# Patient Record
Sex: FEMALE | Race: BLACK OR AFRICAN AMERICAN | NOT HISPANIC OR LATINO | Employment: PART TIME | URBAN - METROPOLITAN AREA
[De-identification: names, ages, dates, MRNs, and addresses within clinical notes are randomized per-mention and may not be internally consistent; named-entity substitution may affect disease eponyms.]

---

## 2017-07-19 ENCOUNTER — HOSPITAL ENCOUNTER (OUTPATIENT)
Dept: ORTHOPEDIC SURGERY | Facility: CLINIC | Age: 47
Discharge: HOME | End: 2017-07-19
Attending: PODIATRIST | Admitting: PODIATRIST

## 2017-08-01 ENCOUNTER — HOSPITAL ENCOUNTER (OUTPATIENT)
Dept: ORTHOPEDIC SURGERY | Facility: CLINIC | Age: 47
Discharge: HOME | End: 2017-08-01
Attending: ORTHOPAEDIC SURGERY | Admitting: ORTHOPAEDIC SURGERY

## 2017-11-08 ENCOUNTER — HOSPITAL ENCOUNTER (OUTPATIENT)
Dept: CT IMAGING | Facility: HOSPITAL | Age: 47
Discharge: HOME | End: 2017-11-08
Attending: NURSE PRACTITIONER | Admitting: NURSE PRACTITIONER

## 2017-11-08 LAB — CREAT BLDA-MCNC: 0.6 MG/DL (ref 0.6–1.3)

## 2018-01-05 ENCOUNTER — HOSPITAL ENCOUNTER (OUTPATIENT)
Dept: PREADMISSION TESTING | Facility: HOSPITAL | Age: 48
Discharge: HOME | End: 2018-01-05
Attending: PODIATRIST | Admitting: PODIATRIST

## 2018-01-05 LAB
ANION GAP SERPL CALC-SCNC: 10.9 MMOL/L (ref 10–20)
BASOPHILS # BLD AUTO: 0 10*3/UL (ref 0–0.2)
BASOPHILS NFR BLD AUTO: 0 % (ref 0–2)
BUN SERPL-MCNC: 8 MG/DL (ref 8–20)
BUN/CREAT SERPL: 11.4 (ref 5.4–26.2)
CALCIUM SERPL-MCNC: 9.6 MG/DL (ref 8.9–10.3)
CHLORIDE SERPL-SCNC: 104 MMOL/L (ref 101–111)
CONV CO2: 28 MMOL/L (ref 22–32)
CREAT UR-MCNC: 0.7 MG/DL (ref 0.4–1)
DIFFERENTIAL METHOD BLD: (no result)
EOSINOPHIL # BLD AUTO: 0.1 10*3/UL (ref 0–0.3)
EOSINOPHIL # BLD AUTO: 1 % (ref 0–3)
ERYTHROCYTE [DISTWIDTH] IN BLOOD BY AUTOMATED COUNT: 14.2 % (ref 11.5–14.5)
GLUCOSE SERPL-MCNC: 101 MG/DL (ref 65–99)
HCT VFR BLD AUTO: 40.9 % (ref 35–49)
HGB BLD-MCNC: 13.6 G/DL (ref 12–15)
LYMPHOCYTES # BLD AUTO: 3.5 10*3/UL (ref 0.8–4.8)
LYMPHOCYTES NFR BLD AUTO: 35 % (ref 18–42)
MCH RBC QN AUTO: 31.4 PG (ref 26–32)
MCHC RBC AUTO-ENTMCNC: 33.1 G/DL (ref 32–36)
MCV RBC AUTO: 94.8 FL (ref 80–94)
MONOCYTES # BLD AUTO: 0.6 10*3/UL (ref 0.1–1.3)
MONOCYTES NFR BLD AUTO: 6 % (ref 2–11)
NEUTROPHILS # BLD AUTO: 5.8 10*3/UL (ref 2.3–8.6)
NEUTROPHILS NFR BLD AUTO: 58 % (ref 50–75)
NRBC BLD AUTO-RTO: 0 /100{WBCS}
NRBC/RBC NFR BLD MANUAL: 0 10*3/UL
PLATELET # BLD AUTO: 245 10*3/UL (ref 150–450)
PMV BLD AUTO: 8.8 FL (ref 7.4–10.4)
POTASSIUM SERPL-SCNC: 3.9 MMOL/L (ref 3.6–5.1)
RBC # BLD AUTO: 4.32 10*6/UL (ref 4–5.4)
SODIUM SERPL-SCNC: 139 MMOL/L (ref 136–144)
WBC # BLD AUTO: 10.1 10*3/UL (ref 4.5–11.5)

## 2018-02-22 ENCOUNTER — HOSPITAL ENCOUNTER (OUTPATIENT)
Dept: GENERAL RADIOLOGY | Facility: HOSPITAL | Age: 48
Discharge: HOME | End: 2018-02-22
Attending: UROLOGY | Admitting: UROLOGY

## 2018-03-01 ENCOUNTER — HOSPITAL ENCOUNTER (OUTPATIENT)
Dept: ORTHOPEDIC SURGERY | Facility: CLINIC | Age: 48
Discharge: HOME | End: 2018-03-01
Attending: PODIATRIST | Admitting: PODIATRIST

## 2018-03-29 ENCOUNTER — HOSPITAL ENCOUNTER (OUTPATIENT)
Dept: ORTHOPEDIC SURGERY | Facility: CLINIC | Age: 48
Discharge: HOME | End: 2018-03-29
Attending: PODIATRIST | Admitting: PODIATRIST

## 2018-05-10 ENCOUNTER — HOSPITAL ENCOUNTER (OUTPATIENT)
Dept: ORTHOPEDIC SURGERY | Facility: CLINIC | Age: 48
Discharge: HOME | End: 2018-05-10
Attending: PODIATRIST | Admitting: PODIATRIST

## 2018-08-22 ENCOUNTER — HOSPITAL ENCOUNTER (OUTPATIENT)
Dept: PHYSICAL THERAPY | Facility: HOSPITAL | Age: 48
Setting detail: RECURRING SERIES
Discharge: HOME | End: 2018-11-04
Attending: PODIATRIST | Admitting: PODIATRIST

## 2019-01-31 ENCOUNTER — HOSPITAL ENCOUNTER (OUTPATIENT)
Dept: ORTHOPEDIC SURGERY | Facility: CLINIC | Age: 49
Discharge: HOME | End: 2019-01-31
Attending: PODIATRIST | Admitting: PODIATRIST

## 2019-07-29 RX ORDER — EMPAGLIFLOZIN 10 MG/1
TABLET, FILM COATED ORAL
Qty: 30 TABLET | Refills: 0 | Status: SHIPPED | OUTPATIENT
Start: 2019-07-29 | End: 2021-08-05

## 2019-08-28 RX ORDER — EMPAGLIFLOZIN 10 MG/1
TABLET, FILM COATED ORAL
Qty: 30 TABLET | Refills: 0 | OUTPATIENT
Start: 2019-08-28

## 2019-09-23 RX ORDER — BLOOD-GLUCOSE METER
KIT MISCELLANEOUS
OUTPATIENT
Start: 2019-09-23

## 2020-11-10 ENCOUNTER — ON CAMPUS - OUTPATIENT (OUTPATIENT)
Dept: URBAN - METROPOLITAN AREA HOSPITAL 77 | Facility: HOSPITAL | Age: 50
End: 2020-11-10
Payer: COMMERCIAL

## 2020-11-10 DIAGNOSIS — R13.10 DYSPHAGIA, UNSPECIFIED: ICD-10-CM

## 2020-11-10 DIAGNOSIS — R07.89 OTHER CHEST PAIN: ICD-10-CM

## 2020-11-10 DIAGNOSIS — K21.9 GASTRO-ESOPHAGEAL REFLUX DISEASE WITHOUT ESOPHAGITIS: ICD-10-CM

## 2020-11-10 DIAGNOSIS — I10 ESSENTIAL (PRIMARY) HYPERTENSION: ICD-10-CM

## 2020-11-10 PROCEDURE — 99202 OFFICE O/P NEW SF 15 MIN: CPT | Performed by: NURSE PRACTITIONER

## 2020-11-11 ENCOUNTER — ON CAMPUS - OUTPATIENT (OUTPATIENT)
Dept: URBAN - METROPOLITAN AREA HOSPITAL 77 | Facility: HOSPITAL | Age: 50
End: 2020-11-11
Payer: COMMERCIAL

## 2020-11-11 DIAGNOSIS — K29.80 DUODENITIS WITHOUT BLEEDING: ICD-10-CM

## 2020-11-11 DIAGNOSIS — R13.10 DYSPHAGIA, UNSPECIFIED: ICD-10-CM

## 2020-11-11 DIAGNOSIS — K22.70 BARRETT'S ESOPHAGUS WITHOUT DYSPLASIA: ICD-10-CM

## 2020-11-11 DIAGNOSIS — K29.50 UNSPECIFIED CHRONIC GASTRITIS WITHOUT BLEEDING: ICD-10-CM

## 2020-11-11 PROCEDURE — 43239 EGD BIOPSY SINGLE/MULTIPLE: CPT | Performed by: INTERNAL MEDICINE

## 2020-11-11 PROCEDURE — 43450 DILATE ESOPHAGUS 1/MULT PASS: CPT | Performed by: INTERNAL MEDICINE

## 2021-03-29 ENCOUNTER — TELEPHONE (OUTPATIENT)
Dept: ENDOCRINOLOGY | Facility: CLINIC | Age: 51
End: 2021-03-29

## 2021-03-29 NOTE — TELEPHONE ENCOUNTER
Pt called stating she was previously a pt of Dr. PHAM but will be seeing him again in August. In the meantime, her PCP told her to call about her blood sugars to [facility]. Pt only had one reading she could give me over the phone. Advised pt to get a couple days worth of BGs to see what adjustments need to be made.

## 2021-07-27 RX ORDER — LISINOPRIL AND HYDROCHLOROTHIAZIDE 25; 20 MG/1; MG/1
TABLET ORAL EVERY 24 HOURS
COMMUNITY
Start: 2017-12-11

## 2021-07-27 RX ORDER — GABAPENTIN 600 MG/1
TABLET ORAL EVERY 6 HOURS
COMMUNITY
Start: 2018-03-01

## 2021-07-27 RX ORDER — GLIMEPIRIDE 4 MG/1
TABLET ORAL EVERY 12 HOURS
COMMUNITY
Start: 2018-12-04 | End: 2021-08-05

## 2021-07-27 RX ORDER — HYDROCODONE BITARTRATE AND ACETAMINOPHEN 10; 325 MG/1; MG/1
TABLET ORAL EVERY 8 HOURS
COMMUNITY
Start: 2017-11-09

## 2021-07-27 RX ORDER — ATORVASTATIN CALCIUM 10 MG/1
TABLET, FILM COATED ORAL
COMMUNITY
Start: 2017-11-09 | End: 2021-08-13

## 2021-07-27 RX ORDER — BLOOD-GLUCOSE METER
KIT MISCELLANEOUS
COMMUNITY
Start: 2018-12-04

## 2021-08-04 PROBLEM — M87.073 AVASCULAR NECROSIS OF TALUS: Status: ACTIVE | Noted: 2017-07-19

## 2021-08-04 PROBLEM — M79.671 FOOT PAIN, RIGHT: Status: ACTIVE | Noted: 2017-07-19

## 2021-08-04 PROBLEM — M19.079 ARTHRITIS OF FOOT: Status: ACTIVE | Noted: 2017-07-19

## 2021-08-04 PROBLEM — M62.81 MUSCLE WEAKNESS OF LOWER EXTREMITY: Status: ACTIVE | Noted: 2018-08-08

## 2021-08-04 PROBLEM — E11.9 TYPE 2 DIABETES MELLITUS WITHOUT COMPLICATIONS: Status: ACTIVE | Noted: 2017-11-09

## 2021-08-04 PROBLEM — Z83.3 FAMILY HISTORY OF DIABETES MELLITUS: Status: ACTIVE | Noted: 2021-08-04

## 2021-08-04 PROBLEM — I10 HYPERTENSION, BENIGN: Status: ACTIVE | Noted: 2018-12-04

## 2021-08-04 PROBLEM — E11.65 TYPE 2 DIABETES MELLITUS WITH HYPERGLYCEMIA: Status: ACTIVE | Noted: 2018-12-04

## 2021-08-04 PROBLEM — M76.70 PERONEAL TENDINITIS: Status: ACTIVE | Noted: 2019-01-31

## 2021-08-04 PROBLEM — E78.5 HYPERLIPIDEMIA: Status: ACTIVE | Noted: 2017-11-09

## 2021-08-05 ENCOUNTER — OFFICE VISIT (OUTPATIENT)
Dept: ENDOCRINOLOGY | Facility: CLINIC | Age: 51
End: 2021-08-05

## 2021-08-05 VITALS
SYSTOLIC BLOOD PRESSURE: 125 MMHG | DIASTOLIC BLOOD PRESSURE: 75 MMHG | OXYGEN SATURATION: 98 % | TEMPERATURE: 97.3 F | BODY MASS INDEX: 39.71 KG/M2 | WEIGHT: 262 LBS | HEIGHT: 68 IN | HEART RATE: 94 BPM

## 2021-08-05 DIAGNOSIS — E11.65 TYPE 2 DIABETES MELLITUS WITH HYPERGLYCEMIA, WITHOUT LONG-TERM CURRENT USE OF INSULIN (HCC): Primary | ICD-10-CM

## 2021-08-05 DIAGNOSIS — E78.2 MIXED HYPERLIPIDEMIA: ICD-10-CM

## 2021-08-05 DIAGNOSIS — I10 HYPERTENSION, BENIGN: ICD-10-CM

## 2021-08-05 DIAGNOSIS — E66.01 MORBID OBESITY (HCC): ICD-10-CM

## 2021-08-05 LAB — GLUCOSE BLDC GLUCOMTR-MCNC: 313 MG/DL (ref 70–105)

## 2021-08-05 PROCEDURE — 82962 GLUCOSE BLOOD TEST: CPT | Performed by: INTERNAL MEDICINE

## 2021-08-05 PROCEDURE — 99214 OFFICE O/P EST MOD 30 MIN: CPT | Performed by: INTERNAL MEDICINE

## 2021-08-05 RX ORDER — INSULIN GLARGINE 100 [IU]/ML
55 INJECTION, SOLUTION SUBCUTANEOUS DAILY
COMMUNITY

## 2021-08-05 RX ORDER — ERGOCALCIFEROL 1.25 MG/1
50000 CAPSULE ORAL DAILY
COMMUNITY

## 2021-08-05 RX ORDER — SITAGLIPTIN AND METFORMIN HYDROCHLORIDE 1000; 50 MG/1; MG/1
1 TABLET, FILM COATED ORAL
COMMUNITY

## 2021-08-05 NOTE — PATIENT INSTRUCTIONS
Please stop smoking  Get your labs done fasting in next few days  Continue your current medications for now.  Continue to work on your diet and activity  Always keep glucose source in case of low blood sugar  Annual eye exam and flu vaccine

## 2021-08-12 ENCOUNTER — LAB (OUTPATIENT)
Dept: LAB | Facility: HOSPITAL | Age: 51
End: 2021-08-12

## 2021-08-12 DIAGNOSIS — E11.65 TYPE 2 DIABETES MELLITUS WITH HYPERGLYCEMIA, WITHOUT LONG-TERM CURRENT USE OF INSULIN (HCC): ICD-10-CM

## 2021-08-12 LAB
ALBUMIN SERPL-MCNC: 4.1 G/DL (ref 3.5–5.2)
ALBUMIN UR-MCNC: 3.5 MG/DL
ALBUMIN/GLOB SERPL: 1.6 G/DL
ALP SERPL-CCNC: 76 U/L (ref 39–117)
ALT SERPL W P-5'-P-CCNC: 21 U/L (ref 1–33)
ANION GAP SERPL CALCULATED.3IONS-SCNC: 13.4 MMOL/L (ref 5–15)
AST SERPL-CCNC: 17 U/L (ref 1–32)
BILIRUB SERPL-MCNC: 0.3 MG/DL (ref 0–1.2)
BUN SERPL-MCNC: 8 MG/DL (ref 6–20)
BUN/CREAT SERPL: 11.6 (ref 7–25)
CALCIUM SPEC-SCNC: 9.1 MG/DL (ref 8.6–10.5)
CHLORIDE SERPL-SCNC: 108 MMOL/L (ref 98–107)
CHOLEST SERPL-MCNC: 230 MG/DL (ref 0–200)
CO2 SERPL-SCNC: 24.6 MMOL/L (ref 22–29)
CREAT SERPL-MCNC: 0.69 MG/DL (ref 0.57–1)
CREAT UR-MCNC: 191.4 MG/DL
GFR SERPL CREATININE-BSD FRML MDRD: 110 ML/MIN/1.73
GLOBULIN UR ELPH-MCNC: 2.5 GM/DL
GLUCOSE SERPL-MCNC: 192 MG/DL (ref 65–99)
HBA1C MFR BLD: 9.7 % (ref 3.5–5.6)
HDLC SERPL-MCNC: 51 MG/DL (ref 40–60)
LDLC SERPL CALC-MCNC: 155 MG/DL (ref 0–100)
LDLC/HDLC SERPL: 2.98 {RATIO}
MICROALBUMIN/CREAT UR: 18.3 MG/G
POTASSIUM SERPL-SCNC: 3.8 MMOL/L (ref 3.5–5.2)
PROT SERPL-MCNC: 6.6 G/DL (ref 6–8.5)
SODIUM SERPL-SCNC: 146 MMOL/L (ref 136–145)
TRIGL SERPL-MCNC: 134 MG/DL (ref 0–150)
TSH SERPL DL<=0.05 MIU/L-ACNC: 1.5 UIU/ML (ref 0.27–4.2)
VLDLC SERPL-MCNC: 24 MG/DL (ref 5–40)

## 2021-08-12 PROCEDURE — 84443 ASSAY THYROID STIM HORMONE: CPT

## 2021-08-12 PROCEDURE — 36415 COLL VENOUS BLD VENIPUNCTURE: CPT

## 2021-08-12 PROCEDURE — 82043 UR ALBUMIN QUANTITATIVE: CPT

## 2021-08-12 PROCEDURE — 80061 LIPID PANEL: CPT

## 2021-08-12 PROCEDURE — 82570 ASSAY OF URINE CREATININE: CPT

## 2021-08-12 PROCEDURE — 83036 HEMOGLOBIN GLYCOSYLATED A1C: CPT

## 2021-08-12 PROCEDURE — 80053 COMPREHEN METABOLIC PANEL: CPT

## 2021-08-13 DIAGNOSIS — E11.65 TYPE 2 DIABETES MELLITUS WITH HYPERGLYCEMIA, WITHOUT LONG-TERM CURRENT USE OF INSULIN (HCC): Primary | ICD-10-CM

## 2021-08-13 DIAGNOSIS — E78.2 MIXED HYPERLIPIDEMIA: Primary | ICD-10-CM

## 2021-08-13 RX ORDER — ATORVASTATIN CALCIUM 10 MG/1
10 TABLET, FILM COATED ORAL DAILY
Qty: 30 TABLET | Refills: 6 | Status: SHIPPED | OUTPATIENT
Start: 2021-08-13 | End: 2022-04-06

## 2021-08-16 ENCOUNTER — TELEPHONE (OUTPATIENT)
Dept: ENDOCRINOLOGY | Facility: CLINIC | Age: 51
End: 2021-08-16

## 2021-08-16 NOTE — TELEPHONE ENCOUNTER
Approved today  PA Case: [id number], Status: Approved, Coverage Starts on: 8/16/2021 12:00:00 AM, Coverage Ends on: 8/16/2022 12:00:00 AM.

## 2022-03-14 ENCOUNTER — TRANSCRIBE ORDERS (OUTPATIENT)
Dept: PHYSICAL THERAPY | Facility: CLINIC | Age: 52
End: 2022-03-14

## 2022-03-14 DIAGNOSIS — M54.50 CHRONIC BILATERAL LOW BACK PAIN, UNSPECIFIED WHETHER SCIATICA PRESENT: Primary | ICD-10-CM

## 2022-03-14 DIAGNOSIS — G89.29 CHRONIC BILATERAL LOW BACK PAIN, UNSPECIFIED WHETHER SCIATICA PRESENT: Primary | ICD-10-CM

## 2022-04-04 ENCOUNTER — TELEPHONE (OUTPATIENT)
Dept: PHYSICAL THERAPY | Facility: CLINIC | Age: 52
End: 2022-04-04

## 2022-04-04 NOTE — TELEPHONE ENCOUNTER
Called pt to inquire about not having the  MD referral. She stated she has also been trying. She has an appt tomorrow and will get the referral then. OK to cxl today.

## 2022-04-06 ENCOUNTER — TRANSCRIBE ORDERS (OUTPATIENT)
Dept: PHYSICAL THERAPY | Facility: CLINIC | Age: 52
End: 2022-04-06

## 2022-04-06 RX ORDER — EMPAGLIFLOZIN 10 MG/1
TABLET, FILM COATED ORAL
Qty: 30 TABLET | Refills: 1 | Status: SHIPPED | OUTPATIENT
Start: 2022-04-06 | End: 2022-06-02

## 2022-04-06 RX ORDER — ATORVASTATIN CALCIUM 10 MG/1
TABLET, FILM COATED ORAL
Qty: 30 TABLET | Refills: 1 | Status: SHIPPED | OUTPATIENT
Start: 2022-04-06 | End: 2022-06-02

## 2022-04-30 ENCOUNTER — HOSPITAL ENCOUNTER (OUTPATIENT)
Dept: GENERAL RADIOLOGY | Facility: HOSPITAL | Age: 52
Discharge: HOME | End: 2022-04-30
Admitting: NURSE PRACTITIONER

## 2022-04-30 ENCOUNTER — TRANSCRIBE ORDERS (OUTPATIENT)
Dept: GENERAL RADIOLOGY | Facility: HOSPITAL | Age: 52
End: 2022-04-30

## 2022-04-30 DIAGNOSIS — M25.579 ARTHRALGIA OF ANKLE, UNSPECIFIED LATERALITY: Primary | ICD-10-CM

## 2022-04-30 DIAGNOSIS — M54.50 LOW BACK PAIN, UNSPECIFIED BACK PAIN LATERALITY, UNSPECIFIED CHRONICITY, UNSPECIFIED WHETHER SCIATICA PRESENT: ICD-10-CM

## 2022-04-30 DIAGNOSIS — M25.562 LEFT KNEE PAIN, UNSPECIFIED CHRONICITY: ICD-10-CM

## 2022-04-30 DIAGNOSIS — M25.579 ARTHRALGIA OF ANKLE, UNSPECIFIED LATERALITY: ICD-10-CM

## 2022-04-30 PROCEDURE — 72110 X-RAY EXAM L-2 SPINE 4/>VWS: CPT

## 2022-06-02 RX ORDER — ATORVASTATIN CALCIUM 10 MG/1
TABLET, FILM COATED ORAL
Qty: 30 TABLET | Refills: 1 | Status: SHIPPED | OUTPATIENT
Start: 2022-06-02

## 2022-06-02 RX ORDER — EMPAGLIFLOZIN 10 MG/1
TABLET, FILM COATED ORAL
Qty: 30 TABLET | Refills: 1 | Status: SHIPPED | OUTPATIENT
Start: 2022-06-02

## 2022-08-09 RX ORDER — EMPAGLIFLOZIN 10 MG/1
TABLET, FILM COATED ORAL
Qty: 30 TABLET | Refills: 1 | OUTPATIENT
Start: 2022-08-09

## 2022-08-09 RX ORDER — ATORVASTATIN CALCIUM 10 MG/1
TABLET, FILM COATED ORAL
Qty: 30 TABLET | Refills: 1 | OUTPATIENT
Start: 2022-08-09

## 2022-11-02 ENCOUNTER — HOSPITAL ENCOUNTER (EMERGENCY)
Facility: HOSPITAL | Age: 52
Discharge: HOME | End: 2022-11-02
Attending: EMERGENCY MEDICINE | Admitting: EMERGENCY MEDICINE

## 2022-11-02 ENCOUNTER — APPOINTMENT (OUTPATIENT)
Dept: ULTRASOUND IMAGING | Facility: HOSPITAL | Age: 52
End: 2022-11-02

## 2022-11-02 ENCOUNTER — APPOINTMENT (OUTPATIENT)
Dept: CT IMAGING | Facility: HOSPITAL | Age: 52
End: 2022-11-02

## 2022-11-02 VITALS
DIASTOLIC BLOOD PRESSURE: 76 MMHG | HEIGHT: 68 IN | BODY MASS INDEX: 39.06 KG/M2 | OXYGEN SATURATION: 97 % | SYSTOLIC BLOOD PRESSURE: 119 MMHG | RESPIRATION RATE: 16 BRPM | WEIGHT: 257.7 LBS | TEMPERATURE: 97.9 F | HEART RATE: 68 BPM

## 2022-11-02 DIAGNOSIS — K57.92 DIVERTICULITIS: Primary | ICD-10-CM

## 2022-11-02 LAB
ALBUMIN SERPL-MCNC: 4.15 G/DL (ref 3.5–5.2)
ALBUMIN/GLOB SERPL: 1.5 G/DL
ALP SERPL-CCNC: 80 U/L (ref 39–117)
ALT SERPL W P-5'-P-CCNC: 15 U/L (ref 1–33)
ANION GAP SERPL CALCULATED.3IONS-SCNC: 7.4 MMOL/L (ref 5–15)
AST SERPL-CCNC: 15 U/L (ref 1–32)
B-HCG UR QL: NEGATIVE
BASOPHILS # BLD AUTO: 0.02 10*3/MM3 (ref 0–0.2)
BASOPHILS NFR BLD AUTO: 0.2 % (ref 0–1.5)
BILIRUB SERPL-MCNC: 0.4 MG/DL (ref 0–1.2)
BILIRUB UR QL STRIP: NEGATIVE
BUN SERPL-MCNC: 10 MG/DL (ref 6–20)
BUN/CREAT SERPL: 16.1 (ref 7–25)
CALCIUM SPEC-SCNC: 9 MG/DL (ref 8.6–10.5)
CHLORIDE SERPL-SCNC: 104 MMOL/L (ref 98–107)
CLARITY UR: ABNORMAL
CO2 SERPL-SCNC: 25.6 MMOL/L (ref 22–29)
COLOR UR: YELLOW
CREAT SERPL-MCNC: 0.62 MG/DL (ref 0.57–1)
D-LACTATE SERPL-SCNC: 0.8 MMOL/L (ref 0.5–2)
DEPRECATED RDW RBC AUTO: 43.4 FL (ref 37–54)
EGFRCR SERPLBLD CKD-EPI 2021: 109.3 ML/MIN/1.73
EOSINOPHIL # BLD AUTO: 0.15 10*3/MM3 (ref 0–0.4)
EOSINOPHIL NFR BLD AUTO: 1.5 % (ref 0.3–6.2)
ERYTHROCYTE [DISTWIDTH] IN BLOOD BY AUTOMATED COUNT: 12.8 % (ref 12.3–15.4)
GLOBULIN UR ELPH-MCNC: 2.9 GM/DL
GLUCOSE SERPL-MCNC: 146 MG/DL (ref 65–99)
GLUCOSE UR STRIP-MCNC: ABNORMAL MG/DL
HCT VFR BLD AUTO: 43.7 % (ref 34–46.6)
HGB BLD-MCNC: 14.8 G/DL (ref 12–15.9)
HGB UR QL STRIP.AUTO: NEGATIVE
HOLD SPECIMEN: NORMAL
HOLD SPECIMEN: NORMAL
IMM GRANULOCYTES # BLD AUTO: 0.03 10*3/MM3 (ref 0–0.05)
IMM GRANULOCYTES NFR BLD AUTO: 0.3 % (ref 0–0.5)
KETONES UR QL STRIP: NEGATIVE
LEUKOCYTE ESTERASE UR QL STRIP.AUTO: NEGATIVE
LIPASE SERPL-CCNC: 31 U/L (ref 13–60)
LYMPHOCYTES # BLD AUTO: 2.39 10*3/MM3 (ref 0.7–3.1)
LYMPHOCYTES NFR BLD AUTO: 23.4 % (ref 19.6–45.3)
MCH RBC QN AUTO: 31 PG (ref 26.6–33)
MCHC RBC AUTO-ENTMCNC: 33.9 G/DL (ref 31.5–35.7)
MCV RBC AUTO: 91.4 FL (ref 79–97)
MONOCYTES # BLD AUTO: 0.65 10*3/MM3 (ref 0.1–0.9)
MONOCYTES NFR BLD AUTO: 6.4 % (ref 5–12)
NEUTROPHILS NFR BLD AUTO: 6.99 10*3/MM3 (ref 1.7–7)
NEUTROPHILS NFR BLD AUTO: 68.2 % (ref 42.7–76)
NITRITE UR QL STRIP: NEGATIVE
PH UR STRIP.AUTO: <=5 [PH] (ref 5–8)
PLATELET # BLD AUTO: 233 10*3/MM3 (ref 140–450)
PMV BLD AUTO: 10.5 FL (ref 6–12)
POTASSIUM SERPL-SCNC: 3.5 MMOL/L (ref 3.5–5.2)
PROT SERPL-MCNC: 7 G/DL (ref 6–8.5)
PROT UR QL STRIP: NEGATIVE
RBC # BLD AUTO: 4.78 10*6/MM3 (ref 3.77–5.28)
SODIUM SERPL-SCNC: 137 MMOL/L (ref 136–145)
SP GR UR STRIP: 1.02 (ref 1–1.03)
UROBILINOGEN UR QL STRIP: ABNORMAL
WBC NRBC COR # BLD: 10.23 10*3/MM3 (ref 3.4–10.8)
WHOLE BLOOD HOLD SPECIMEN: NORMAL

## 2022-11-02 PROCEDURE — 99283 EMERGENCY DEPT VISIT LOW MDM: CPT

## 2022-11-02 PROCEDURE — 81025 URINE PREGNANCY TEST: CPT | Performed by: EMERGENCY MEDICINE

## 2022-11-02 PROCEDURE — 83605 ASSAY OF LACTIC ACID: CPT | Performed by: EMERGENCY MEDICINE

## 2022-11-02 PROCEDURE — 25010000002 ONDANSETRON PER 1 MG: Performed by: EMERGENCY MEDICINE

## 2022-11-02 PROCEDURE — 85025 COMPLETE CBC W/AUTO DIFF WBC: CPT | Performed by: EMERGENCY MEDICINE

## 2022-11-02 PROCEDURE — 87040 BLOOD CULTURE FOR BACTERIA: CPT | Performed by: EMERGENCY MEDICINE

## 2022-11-02 PROCEDURE — 96376 TX/PRO/DX INJ SAME DRUG ADON: CPT

## 2022-11-02 PROCEDURE — 99283 EMERGENCY DEPT VISIT LOW MDM: CPT | Performed by: EMERGENCY MEDICINE

## 2022-11-02 PROCEDURE — 36415 COLL VENOUS BLD VENIPUNCTURE: CPT

## 2022-11-02 PROCEDURE — 25010000002 HYDROMORPHONE 1 MG/ML SOLUTION: Performed by: EMERGENCY MEDICINE

## 2022-11-02 PROCEDURE — 96374 THER/PROPH/DIAG INJ IV PUSH: CPT

## 2022-11-02 PROCEDURE — 81003 URINALYSIS AUTO W/O SCOPE: CPT | Performed by: EMERGENCY MEDICINE

## 2022-11-02 PROCEDURE — 96375 TX/PRO/DX INJ NEW DRUG ADDON: CPT

## 2022-11-02 PROCEDURE — 96365 THER/PROPH/DIAG IV INF INIT: CPT

## 2022-11-02 PROCEDURE — 83690 ASSAY OF LIPASE: CPT | Performed by: EMERGENCY MEDICINE

## 2022-11-02 PROCEDURE — 74176 CT ABD & PELVIS W/O CONTRAST: CPT

## 2022-11-02 PROCEDURE — 80053 COMPREHEN METABOLIC PANEL: CPT | Performed by: EMERGENCY MEDICINE

## 2022-11-02 RX ORDER — METRONIDAZOLE 500 MG/1
500 TABLET ORAL 2 TIMES DAILY
Qty: 20 TABLET | Refills: 0 | Status: SHIPPED | OUTPATIENT
Start: 2022-11-02 | End: 2022-11-12

## 2022-11-02 RX ORDER — METRONIDAZOLE 500 MG/100ML
500 INJECTION, SOLUTION INTRAVENOUS ONCE
Status: COMPLETED | OUTPATIENT
Start: 2022-11-02 | End: 2022-11-02

## 2022-11-02 RX ORDER — DICYCLOMINE HCL 20 MG
20 TABLET ORAL EVERY 6 HOURS
Qty: 40 TABLET | Refills: 0 | Status: SHIPPED | OUTPATIENT
Start: 2022-11-02 | End: 2022-11-12

## 2022-11-02 RX ORDER — AMOXICILLIN AND CLAVULANATE POTASSIUM 875; 125 MG/1; MG/1
1 TABLET, FILM COATED ORAL ONCE
Status: COMPLETED | OUTPATIENT
Start: 2022-11-02 | End: 2022-11-02

## 2022-11-02 RX ORDER — SODIUM CHLORIDE 0.9 % (FLUSH) 0.9 %
10 SYRINGE (ML) INJECTION AS NEEDED
Status: DISCONTINUED | OUTPATIENT
Start: 2022-11-02 | End: 2022-11-02 | Stop reason: HOSPADM

## 2022-11-02 RX ORDER — ONDANSETRON 2 MG/ML
4 INJECTION INTRAMUSCULAR; INTRAVENOUS ONCE
Status: COMPLETED | OUTPATIENT
Start: 2022-11-02 | End: 2022-11-02

## 2022-11-02 RX ORDER — NAPROXEN 500 MG/1
500 TABLET ORAL 2 TIMES DAILY WITH MEALS
Qty: 10 TABLET | Refills: 0 | Status: SHIPPED | OUTPATIENT
Start: 2022-11-02 | End: 2022-11-07

## 2022-11-02 RX ORDER — AMOXICILLIN AND CLAVULANATE POTASSIUM 875; 125 MG/1; MG/1
1 TABLET, FILM COATED ORAL 2 TIMES DAILY
Qty: 20 TABLET | Refills: 0 | Status: SHIPPED | OUTPATIENT
Start: 2022-11-02 | End: 2022-11-12

## 2022-11-02 RX ORDER — ONDANSETRON 4 MG/1
4 TABLET, FILM COATED ORAL EVERY 4 HOURS PRN
Qty: 12 TABLET | Refills: 0 | Status: SHIPPED | OUTPATIENT
Start: 2022-11-02 | End: 2022-11-05

## 2022-11-02 RX ADMIN — METRONIDAZOLE 500 MG: 500 INJECTION, SOLUTION INTRAVENOUS at 12:28

## 2022-11-02 RX ADMIN — HYDROMORPHONE HYDROCHLORIDE 1 MG: 1 INJECTION, SOLUTION INTRAMUSCULAR; INTRAVENOUS; SUBCUTANEOUS at 12:25

## 2022-11-02 RX ADMIN — SODIUM CHLORIDE 1000 ML: 9 INJECTION, SOLUTION INTRAVENOUS at 11:12

## 2022-11-02 RX ADMIN — ONDANSETRON 4 MG: 2 INJECTION INTRAMUSCULAR; INTRAVENOUS at 12:25

## 2022-11-02 RX ADMIN — AMOXICILLIN AND CLAVULANATE POTASSIUM 1 TABLET: 875; 125 TABLET, FILM COATED ORAL at 12:28

## 2022-11-02 RX ADMIN — HYDROMORPHONE HYDROCHLORIDE 0.5 MG: 1 INJECTION, SOLUTION INTRAMUSCULAR; INTRAVENOUS; SUBCUTANEOUS at 11:12

## 2022-11-02 RX ADMIN — ONDANSETRON 4 MG: 2 INJECTION INTRAMUSCULAR; INTRAVENOUS at 11:12
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
## 2022-11-02 NOTE — ED PROVIDER NOTES
Subjective   History of Present Illness  51-year-old female with history of renal stones diverticulitis and ovarian cyst presents with pelvic lower abdominal pain.  Patient did have 1 episode of mild left flank pain earlier in the week so she is unsure if she is passing a kidney stone or if this is her ovarian cyst or diverticulitis flaring up no fever no chills no nausea no vomiting no chest pain no shortness of breath no blood in urine no urgency no frequency no hematuria no dysuria no diarrhea no constipation no vaginal bleeding or discharge no genital sores not related to eating.        Review of Systems   Constitutional: Negative for activity change, appetite change, chills, diaphoresis, fatigue and fever.   HENT: Negative for congestion, dental problem, drooling, hearing loss, nosebleeds, postnasal drip, rhinorrhea, sinus pressure, sinus pain, sneezing, sore throat, tinnitus and trouble swallowing.    Eyes: Negative for photophobia, pain, discharge, redness, itching and visual disturbance.   Respiratory: Negative for apnea, cough, choking, chest tightness, shortness of breath, wheezing and stridor.    Cardiovascular: Negative for chest pain, palpitations and leg swelling.   Gastrointestinal: Positive for abdominal pain. Negative for abdominal distention, blood in stool, constipation, diarrhea, nausea, rectal pain and vomiting.   Endocrine: Negative for cold intolerance, heat intolerance and polydipsia.   Genitourinary: Positive for pelvic pain. Negative for difficulty urinating, dysuria, enuresis, flank pain and frequency.   Musculoskeletal: Negative for arthralgias, back pain, gait problem, joint swelling and myalgias.   Skin: Negative for color change, pallor and rash.   Allergic/Immunologic: Negative for environmental allergies and food allergies.   Neurological: Negative for dizziness, seizures, facial asymmetry, speech difficulty, light-headedness, numbness and headaches.   Hematological: Negative for  adenopathy. Does not bruise/bleed easily.   Psychiatric/Behavioral: Negative for agitation, behavioral problems, decreased concentration, dysphoric mood, hallucinations and self-injury. The patient is not nervous/anxious.    All other systems reviewed and are negative.      Past Medical History:   Diagnosis Date   • DM (diabetes mellitus), type 2 (HCC)    • History of MRSA infection    • Hyperlipidemia    • Kidney stone    • Torn meniscus     left knee pain- torn meniscus (ABSTRACTED FROM [facility])       No Known Allergies    Past Surgical History:   Procedure Laterality Date   • CYSTOSCOPY W/ LITHOLAPAXY / [facility]     • FOOT SURGERY Right 2018   • ORIF TIBIA/FIBULA FRACTURES Right     right tib/fib ORIF (ABSTRACTED FROM [facility])   • OTHER SURGICAL HISTORY Right 01/12/2018    right fusion tibiotalocalcaneal (ABSTRACTED FROM [employer])   • TUBAL ABDOMINAL LIGATION  2001       Family History   Problem Relation Age of Onset   • Diabetes Other        Social History     Socioeconomic History   • Marital status:    Tobacco Use   • Smoking status: Every Day   • Smokeless tobacco: Never   Vaping Use   • Vaping Use: Never used   Substance and Sexual Activity   • Alcohol use: No   • Drug use: No   • Sexual activity: Defer           Objective   Physical Exam  Vitals and nursing note reviewed.   Constitutional:       General: She is not in acute distress.     Appearance: Normal appearance. She is not ill-appearing, toxic-appearing or diaphoretic.   HENT:      Head: Normocephalic and atraumatic.      Right Ear: Tympanic membrane, ear canal and external ear normal. There is no impacted cerumen.      Left Ear: Tympanic membrane, ear canal and external ear normal. There is no impacted cerumen.      Nose: Nose normal. No congestion or rhinorrhea.      Mouth/Throat:      Mouth: Mucous membranes are moist.      Pharynx: Oropharynx is clear. No oropharyngeal exudate or posterior oropharyngeal erythema.   Eyes:      General: No  scleral icterus.        Right eye: No discharge.         Left eye: No discharge.      Conjunctiva/sclera: Conjunctivae normal.      Pupils: Pupils are equal, round, and reactive to light.   Neck:      Vascular: No carotid bruit.   Cardiovascular:      Rate and Rhythm: Normal rate and regular rhythm.      Pulses: Normal pulses.      Heart sounds: Normal heart sounds. No murmur heard.    No friction rub. No gallop.   Pulmonary:      Effort: Pulmonary effort is normal. No respiratory distress.      Breath sounds: Normal breath sounds. No stridor. No wheezing, rhonchi or rales.   Chest:      Chest wall: No tenderness.   Abdominal:      General: Abdomen is flat. Bowel sounds are normal. There is no distension.      Palpations: Abdomen is soft. There is no mass.      Tenderness: There is no abdominal tenderness. There is no right CVA tenderness, left CVA tenderness, guarding or rebound.      Hernia: No hernia is present.   Genitourinary:     Comments: Pelvic exam declined by patient despite risk.  Musculoskeletal:         General: No swelling, tenderness, deformity or signs of injury. Normal range of motion.      Cervical back: Normal range of motion and neck supple. No rigidity or tenderness.      Right lower leg: No edema.      Left lower leg: No edema.   Lymphadenopathy:      Cervical: No cervical adenopathy.   Skin:     General: Skin is warm and dry.      Capillary Refill: Capillary refill takes less than 2 seconds.      Coloration: Skin is not jaundiced or pale.      Findings: No bruising, erythema, lesion or rash.   Neurological:      General: No focal deficit present.      Mental Status: She is alert and oriented to person, place, and time. Mental status is at baseline.      Cranial Nerves: No cranial nerve deficit.      Sensory: No sensory deficit.      Motor: No weakness.      Coordination: Coordination normal.      Gait: Gait normal.      Deep Tendon Reflexes: Reflexes normal.   Psychiatric:         Mood and  Affect: Mood normal.         Behavior: Behavior normal.         Thought Content: Thought content normal.         Judgment: Judgment normal.         Procedures           ED Course                                           MDM  Number of Diagnoses or Management Options  Diverticulitis  Diagnosis management comments: Given history of renal stones ovarian cyst and diverticulitis will do CT abdomen pelvis and ultrasound.  Labs pain medicine nausea medicine IV fluids    1231: Repeat abdominal exam is nontender patient was unable to have transvaginal or transabdominal pelvic ultrasound as patient declined to the tech despite risk and counseling.  However patient does have diverticulitis on CT scan we will treat with Augmentin and Flagyl for home as well as pain control.  Patient tolerating p.o. no signs of perforation or abscess on CT scan unable to rule out ovarian torsion or ovarian or uterine causes of pain due to lack of pelvic ultrasound patient is understands the and is aware of this risk is AOx4 voices understanding extensive return to ER precautions and education done with patient.      Final diagnoses:   Diverticulitis       ED Disposition  ED Disposition     ED Disposition   Discharge    Condition   Stable    Comment   --             Abril Hopper, APRN  [address] [address]  [phone]               Medication List      New Prescriptions    amoxicillin-clavulanate 875-125 MG per tablet  Commonly known as: AUGMENTIN  Take 1 tablet by mouth 2 (Two) Times a Day for 10 days.     dicyclomine 20 MG tablet  Commonly known as: BENTYL  Take 1 tablet by mouth Every 6 (Six) Hours for 10 days.     metroNIDAZOLE 500 MG tablet  Commonly known as: FLAGYL  Take 1 tablet by mouth 2 (Two) Times a Day for 10 days.     naproxen 500 MG EC tablet  Commonly known as: EC NAPROSYN  Take 1 tablet by mouth 2 (Two) Times a Day With Meals for 5 days.     ondansetron 4 MG tablet  Commonly known as:  ZOFRAN  Take 1 tablet by mouth Every 4 (Four) Hours As Needed for Nausea or Vomiting for up to 3 days.           Where to Get Your Medications      These medications were sent to [facility], [place] - [address] - [phone]  - [phone] FX  [address] [place] [address]    Phone: [phone]   · amoxicillin-clavulanate 875-125 MG per tablet  · dicyclomine 20 MG tablet  · metroNIDAZOLE 500 MG tablet  · naproxen 500 MG EC tablet  · ondansetron 4 MG tablet          Rony Momin MD  11/02/22 5534

## 2022-11-06 ENCOUNTER — HOSPITAL ENCOUNTER (EMERGENCY)
Facility: HOSPITAL | Age: 52
Discharge: HOME | End: 2022-11-07
Attending: EMERGENCY MEDICINE | Admitting: EMERGENCY MEDICINE

## 2022-11-06 ENCOUNTER — APPOINTMENT (OUTPATIENT)
Dept: GENERAL RADIOLOGY | Facility: HOSPITAL | Age: 52
End: 2022-11-06

## 2022-11-06 DIAGNOSIS — K57.92 DIVERTICULITIS: Primary | ICD-10-CM

## 2022-11-06 LAB
ALBUMIN SERPL-MCNC: 4.18 G/DL (ref 3.5–5.2)
ALBUMIN/GLOB SERPL: 1.5 G/DL
ALP SERPL-CCNC: 103 U/L (ref 39–117)
ALT SERPL W P-5'-P-CCNC: 20 U/L (ref 1–33)
ANION GAP SERPL CALCULATED.3IONS-SCNC: 7.1 MMOL/L (ref 5–15)
AST SERPL-CCNC: 17 U/L (ref 1–32)
BASOPHILS # BLD AUTO: 0.03 10*3/MM3 (ref 0–0.2)
BASOPHILS NFR BLD AUTO: 0.3 % (ref 0–1.5)
BILIRUB SERPL-MCNC: <0.2 MG/DL (ref 0–1.2)
BILIRUB UR QL STRIP: NEGATIVE
BUN SERPL-MCNC: 9 MG/DL (ref 6–20)
BUN/CREAT SERPL: 11.7 (ref 7–25)
CALCIUM SPEC-SCNC: 9.4 MG/DL (ref 8.6–10.5)
CHLORIDE SERPL-SCNC: 100 MMOL/L (ref 98–107)
CLARITY UR: ABNORMAL
CO2 SERPL-SCNC: 29.9 MMOL/L (ref 22–29)
COLOR UR: YELLOW
CREAT SERPL-MCNC: 0.77 MG/DL (ref 0.57–1)
DEPRECATED RDW RBC AUTO: 42.9 FL (ref 37–54)
EGFRCR SERPLBLD CKD-EPI 2021: 94.1 ML/MIN/1.73
EOSINOPHIL # BLD AUTO: 0.12 10*3/MM3 (ref 0–0.4)
EOSINOPHIL NFR BLD AUTO: 1.3 % (ref 0.3–6.2)
ERYTHROCYTE [DISTWIDTH] IN BLOOD BY AUTOMATED COUNT: 12.7 % (ref 12.3–15.4)
GLOBULIN UR ELPH-MCNC: 2.7 GM/DL
GLUCOSE SERPL-MCNC: 280 MG/DL (ref 65–99)
GLUCOSE UR STRIP-MCNC: ABNORMAL MG/DL
HCT VFR BLD AUTO: 42.2 % (ref 34–46.6)
HGB BLD-MCNC: 14.3 G/DL (ref 12–15.9)
HGB UR QL STRIP.AUTO: NEGATIVE
IMM GRANULOCYTES # BLD AUTO: 0.03 10*3/MM3 (ref 0–0.05)
IMM GRANULOCYTES NFR BLD AUTO: 0.3 % (ref 0–0.5)
KETONES UR QL STRIP: NEGATIVE
LEUKOCYTE ESTERASE UR QL STRIP.AUTO: NEGATIVE
LIPASE SERPL-CCNC: 35 U/L (ref 13–60)
LYMPHOCYTES # BLD AUTO: 3.32 10*3/MM3 (ref 0.7–3.1)
LYMPHOCYTES NFR BLD AUTO: 36.7 % (ref 19.6–45.3)
MCH RBC QN AUTO: 31 PG (ref 26.6–33)
MCHC RBC AUTO-ENTMCNC: 33.9 G/DL (ref 31.5–35.7)
MCV RBC AUTO: 91.3 FL (ref 79–97)
MONOCYTES # BLD AUTO: 0.45 10*3/MM3 (ref 0.1–0.9)
MONOCYTES NFR BLD AUTO: 5 % (ref 5–12)
NEUTROPHILS NFR BLD AUTO: 5.1 10*3/MM3 (ref 1.7–7)
NEUTROPHILS NFR BLD AUTO: 56.4 % (ref 42.7–76)
NITRITE UR QL STRIP: NEGATIVE
PH UR STRIP.AUTO: 7 [PH] (ref 5–8)
PLATELET # BLD AUTO: 284 10*3/MM3 (ref 140–450)
PMV BLD AUTO: 10.2 FL (ref 6–12)
POTASSIUM SERPL-SCNC: 3.9 MMOL/L (ref 3.5–5.2)
PROT SERPL-MCNC: 6.9 G/DL (ref 6–8.5)
PROT UR QL STRIP: NEGATIVE
RBC # BLD AUTO: 4.62 10*6/MM3 (ref 3.77–5.28)
SODIUM SERPL-SCNC: 137 MMOL/L (ref 136–145)
SP GR UR STRIP: 1.02 (ref 1–1.03)
UROBILINOGEN UR QL STRIP: ABNORMAL
WBC NRBC COR # BLD: 9.05 10*3/MM3 (ref 3.4–10.8)

## 2022-11-06 PROCEDURE — 96374 THER/PROPH/DIAG INJ IV PUSH: CPT

## 2022-11-06 PROCEDURE — 81003 URINALYSIS AUTO W/O SCOPE: CPT | Performed by: EMERGENCY MEDICINE

## 2022-11-06 PROCEDURE — 96376 TX/PRO/DX INJ SAME DRUG ADON: CPT

## 2022-11-06 PROCEDURE — 99283 EMERGENCY DEPT VISIT LOW MDM: CPT

## 2022-11-06 PROCEDURE — 83690 ASSAY OF LIPASE: CPT | Performed by: EMERGENCY MEDICINE

## 2022-11-06 PROCEDURE — 96375 TX/PRO/DX INJ NEW DRUG ADDON: CPT

## 2022-11-06 PROCEDURE — 74022 RADEX COMPL AQT ABD SERIES: CPT

## 2022-11-06 PROCEDURE — 25010000002 ONDANSETRON PER 1 MG: Performed by: EMERGENCY MEDICINE

## 2022-11-06 PROCEDURE — 25010000002 MORPHINE PER 10 MG: Performed by: EMERGENCY MEDICINE

## 2022-11-06 PROCEDURE — 80053 COMPREHEN METABOLIC PANEL: CPT | Performed by: EMERGENCY MEDICINE

## 2022-11-06 PROCEDURE — 85025 COMPLETE CBC W/AUTO DIFF WBC: CPT | Performed by: EMERGENCY MEDICINE

## 2022-11-06 RX ORDER — ONDANSETRON 2 MG/ML
4 INJECTION INTRAMUSCULAR; INTRAVENOUS ONCE
Status: COMPLETED | OUTPATIENT
Start: 2022-11-07 | End: 2022-11-06

## 2022-11-06 RX ORDER — MORPHINE SULFATE 2 MG/ML
2 INJECTION, SOLUTION INTRAMUSCULAR; INTRAVENOUS ONCE
Status: COMPLETED | OUTPATIENT
Start: 2022-11-07 | End: 2022-11-06

## 2022-11-06 RX ORDER — MORPHINE SULFATE 2 MG/ML
2 INJECTION, SOLUTION INTRAMUSCULAR; INTRAVENOUS ONCE
Status: COMPLETED | OUTPATIENT
Start: 2022-11-06 | End: 2022-11-06

## 2022-11-06 RX ORDER — SODIUM CHLORIDE 0.9 % (FLUSH) 0.9 %
10 SYRINGE (ML) INJECTION AS NEEDED
Status: DISCONTINUED | OUTPATIENT
Start: 2022-11-06 | End: 2022-11-07 | Stop reason: HOSPADM

## 2022-11-06 RX ORDER — ONDANSETRON 2 MG/ML
4 INJECTION INTRAMUSCULAR; INTRAVENOUS ONCE
Status: COMPLETED | OUTPATIENT
Start: 2022-11-06 | End: 2022-11-06

## 2022-11-06 RX ADMIN — SODIUM CHLORIDE 1000 ML: 9 INJECTION, SOLUTION INTRAVENOUS at 20:56

## 2022-11-06 RX ADMIN — MORPHINE SULFATE 2 MG: 2 INJECTION, SOLUTION INTRAMUSCULAR; INTRAVENOUS at 20:56

## 2022-11-06 RX ADMIN — ONDANSETRON 4 MG: 2 INJECTION INTRAMUSCULAR; INTRAVENOUS at 20:56

## 2022-11-06 RX ADMIN — ONDANSETRON 4 MG: 2 INJECTION INTRAMUSCULAR; INTRAVENOUS at 23:20

## 2022-11-06 RX ADMIN — MORPHINE SULFATE 2 MG: 2 INJECTION, SOLUTION INTRAMUSCULAR; INTRAVENOUS at 23:20

## 2022-11-07 ENCOUNTER — OFFICE (OUTPATIENT)
Dept: URBAN - METROPOLITAN AREA CLINIC 64 | Facility: CLINIC | Age: 52
End: 2022-11-07
Payer: COMMERCIAL

## 2022-11-07 VITALS
WEIGHT: 262 LBS | HEART RATE: 69 BPM | SYSTOLIC BLOOD PRESSURE: 124 MMHG | HEIGHT: 68 IN | DIASTOLIC BLOOD PRESSURE: 88 MMHG

## 2022-11-07 VITALS
RESPIRATION RATE: 18 BRPM | SYSTOLIC BLOOD PRESSURE: 128 MMHG | HEIGHT: 68 IN | TEMPERATURE: 98.4 F | WEIGHT: 250 LBS | HEART RATE: 73 BPM | BODY MASS INDEX: 37.89 KG/M2 | OXYGEN SATURATION: 95 % | DIASTOLIC BLOOD PRESSURE: 85 MMHG

## 2022-11-07 DIAGNOSIS — K57.92 DIVERTICULITIS OF INTESTINE, PART UNSPECIFIED, WITHOUT PERFO: ICD-10-CM

## 2022-11-07 DIAGNOSIS — R93.3 ABNORMAL FINDINGS ON DIAGNOSTIC IMAGING OF OTHER PARTS OF DI: ICD-10-CM

## 2022-11-07 DIAGNOSIS — K59.00 CONSTIPATION, UNSPECIFIED: ICD-10-CM

## 2022-11-07 LAB
BACTERIA SPEC AEROBE CULT: NORMAL
BACTERIA SPEC AEROBE CULT: NORMAL

## 2022-11-07 PROCEDURE — 25010000002 ONDANSETRON PER 1 MG: Performed by: EMERGENCY MEDICINE

## 2022-11-07 PROCEDURE — 25010000002 MORPHINE PER 10 MG: Performed by: EMERGENCY MEDICINE

## 2022-11-07 PROCEDURE — 99284 EMERGENCY DEPT VISIT MOD MDM: CPT | Performed by: EMERGENCY MEDICINE

## 2022-11-07 PROCEDURE — 99214 OFFICE O/P EST MOD 30 MIN: CPT

## 2022-11-07 RX ORDER — SORBITOL SOLUTION 70 %
SOLUTION, ORAL MISCELLANEOUS
Qty: 100 | Refills: 0 | Status: COMPLETED
Start: 2022-11-07 | End: 2022-12-05

## 2022-11-07 RX ORDER — ONDANSETRON 4 MG/1
12 TABLET, ORALLY DISINTEGRATING ORAL
Qty: 30 | Refills: 0 | Status: ACTIVE
Start: 2022-11-07

## 2022-11-07 NOTE — ED NOTES
Pt discharged home in NAD. Pt stated that she did not wish to be admitted and would go home to follow up with GI this week. Pt educated on home care and follow up instructions.

## 2022-11-07 NOTE — ED PROVIDER NOTES
Subjective   History of Present Illness  52 yof complains of abdominal pain and nausea. She was here a few days ago and diagnosed with diverticulitis. Pt was started on antibiotics. Pt states she has been taking her medication but she does not feel much better. She reports feeling constipated and having nausea. Pt denies fever, vomiting or diarrhea.         Review of Systems   Constitutional: Negative.    HENT: Negative.    Respiratory: Negative.    Gastrointestinal: Positive for abdominal pain, constipation and nausea.   Genitourinary: Negative.    Musculoskeletal: Positive for myalgias.   Skin: Negative.    Neurological: Negative.    All other systems reviewed and are negative.      Past Medical History:   Diagnosis Date   • DM (diabetes mellitus), type 2 (HCC)    • History of MRSA infection    • Hyperlipidemia    • Kidney stone    • Torn meniscus     left knee pain- torn meniscus (ABSTRACTED FROM [employer])       No Known Allergies    Past Surgical History:   Procedure Laterality Date   • CYSTOSCOPY W/ LITHOLAPAXY / [facility]     • FOOT SURGERY Right 2018   • ORIF TIBIA/FIBULA FRACTURES Right     right tib/fib ORIF (ABSTRACTED FROM [employer])   • OTHER SURGICAL HISTORY Right 01/12/2018    right fusion tibiotalocalcaneal (ABSTRACTED FROM [employer])   • TUBAL ABDOMINAL LIGATION  2001       Family History   Problem Relation Age of Onset   • Diabetes Other        Social History     Socioeconomic History   • Marital status:    Tobacco Use   • Smoking status: Every Day   • Smokeless tobacco: Never   Vaping Use   • Vaping Use: Never used   Substance and Sexual Activity   • Alcohol use: No   • Drug use: No   • Sexual activity: Defer           Objective   Physical Exam  Vitals reviewed.   Constitutional:       Appearance: She is well-developed.   HENT:      Head: Normocephalic and atraumatic.      Mouth/Throat:      Mouth: Mucous membranes are moist.   Eyes:      Extraocular Movements: Extraocular movements intact.       Pupils: Pupils are equal, round, and reactive to light.   Cardiovascular:      Rate and Rhythm: Normal rate and regular rhythm.      Heart sounds: Normal heart sounds.   Pulmonary:      Effort: Pulmonary effort is normal.      Breath sounds: Normal breath sounds.   Abdominal:      General: Abdomen is flat. Bowel sounds are normal.      Palpations: Abdomen is soft.      Tenderness: There is generalized abdominal tenderness.   Skin:     General: Skin is warm and dry.   Neurological:      Mental Status: She is alert.         Procedures           ED Course    Plan to admit patient for failing outpatient management. However patient has decided that she can not be admitted at this time due to childcare issues. Pt is feeling better and wants to go home. Pt reports if she feels bad in the morning she will go to the hospital.                                        MDM    Final diagnoses:   Diverticulitis       ED Disposition  ED Disposition     ED Disposition   Discharge    Condition   Stable    Comment   --             Abril Hopper, APRN  [address] [id number] [phone]    Schedule an appointment as soon as possible for a visit       [facility] [address] [address] [phone]  Schedule an appointment as soon as possible for a visit            Medication List      Stop    ondansetron 4 MG tablet  Commonly known as: Rosemary Singer MD  11/07/22 0542

## 2022-12-01 ENCOUNTER — HOSPITAL ENCOUNTER (OUTPATIENT)
Facility: HOSPITAL | Age: 52
Discharge: HOME | End: 2022-12-01
Attending: EMERGENCY MEDICINE | Admitting: EMERGENCY MEDICINE

## 2022-12-01 VITALS
TEMPERATURE: 98 F | BODY MASS INDEX: 37.13 KG/M2 | OXYGEN SATURATION: 99 % | SYSTOLIC BLOOD PRESSURE: 156 MMHG | DIASTOLIC BLOOD PRESSURE: 78 MMHG | RESPIRATION RATE: 16 BRPM | HEART RATE: 76 BPM | HEIGHT: 68 IN | WEIGHT: 245 LBS

## 2022-12-01 DIAGNOSIS — J06.9 VIRAL URI WITH COUGH: Primary | ICD-10-CM

## 2022-12-01 LAB
FLUAV SUBTYP SPEC NAA+PROBE: NOT DETECTED
FLUBV RNA ISLT QL NAA+PROBE: NOT DETECTED
SARS-COV-2 RNA RESP QL NAA+PROBE: NOT DETECTED

## 2022-12-01 PROCEDURE — 87636 SARSCOV2 & INF A&B AMP PRB: CPT | Performed by: EMERGENCY MEDICINE

## 2022-12-01 PROCEDURE — 87636 SARSCOV2 & INF A&B AMP PRB: CPT

## 2022-12-01 PROCEDURE — 99213 OFFICE O/P EST LOW 20 MIN: CPT | Performed by: EMERGENCY MEDICINE

## 2022-12-01 PROCEDURE — G0463 HOSPITAL OUTPT CLINIC VISIT: HCPCS | Performed by: EMERGENCY MEDICINE

## 2022-12-01 RX ORDER — BROMPHENIRAMINE MALEATE, PSEUDOEPHEDRINE HYDROCHLORIDE, AND DEXTROMETHORPHAN HYDROBROMIDE 2; 30; 10 MG/5ML; MG/5ML; MG/5ML
5 SYRUP ORAL 4 TIMES DAILY PRN
Qty: 210 ML | Refills: 0 | Status: SHIPPED | OUTPATIENT
Start: 2022-12-01

## 2022-12-01 RX ORDER — ALBUTEROL SULFATE 90 UG/1
2 AEROSOL, METERED RESPIRATORY (INHALATION) EVERY 4 HOURS PRN
Qty: 1 G | Refills: 0 | Status: SHIPPED | OUTPATIENT
Start: 2022-12-01

## 2022-12-01 NOTE — FSED PROVIDER NOTE
Subjective   History of Present Illness  Pt here with relative with similar complaints of cough, congestion, myalgias and aches, pt does smoke, otc meds not helping with her cough, no n/v/d, monie po well without abd pain, no cp/soa    History provided by:  Patient   used: No        Review of Systems   Constitutional: Positive for fatigue.   HENT: Positive for congestion and sinus pressure.    Respiratory: Negative for apnea.    Cardiovascular: Negative for chest pain.   All other systems reviewed and are negative.      Past Medical History:   Diagnosis Date   • DM (diabetes mellitus), type 2 (HCC)    • History of MRSA infection    • Hyperlipidemia    • Kidney stone    • Torn meniscus     left knee pain- torn meniscus (ABSTRACTED FROM [employer])       No Known Allergies    Past Surgical History:   Procedure Laterality Date   • CYSTOSCOPY W/ LITHOLAPAXY / E[facility]     • FOOT SURGERY Right 2018   • ORIF TIBIA/FIBULA FRACTURES Right     right tib/fib ORIF (ABSTRACTED FROM [employer])   • OTHER SURGICAL HISTORY Right 01/12/2018    right fusion tibiotalocalcaneal (ABSTRACTED FROM [employer])   • TUBAL ABDOMINAL LIGATION  2001       Family History   Problem Relation Age of Onset   • Diabetes Other        Social History     Socioeconomic History   • Marital status:    Tobacco Use   • Smoking status: Every Day   • Smokeless tobacco: Never   Vaping Use   • Vaping Use: Never used   Substance and Sexual Activity   • Alcohol use: No   • Drug use: No   • Sexual activity: Defer           Objective   Physical Exam  Vitals and nursing note reviewed.   HENT:      Head: Normocephalic.      Nose: Nose normal.      Mouth/Throat:      Mouth: Mucous membranes are moist.   Eyes:      Conjunctiva/sclera: Conjunctivae normal.   Cardiovascular:      Rate and Rhythm: Normal rate and regular rhythm.   Pulmonary:      Effort: Pulmonary effort is normal.   Musculoskeletal:         General: Normal range of motion.       Cervical back: Normal range of motion.   Skin:     General: Skin is warm.      Capillary Refill: Capillary refill takes less than 2 seconds.   Neurological:      General: No focal deficit present.      Mental Status: She is alert.   Psychiatric:         Mood and Affect: Mood normal.         Procedures           ED Course                                           MDM    Final diagnoses:   Viral URI with cough       ED Disposition  ED Disposition     ED Disposition   Discharge    Condition   Stable    Comment   --             Abril Hopper, APRN  [address] [facility] [address]  [place] [address]  [phone]    In 3 days  If symptoms worsen         Medication List      New Prescriptions    albuterol sulfate  (90 Base) MCG/ACT inhaler  Commonly known as: PROVENTIL HFA;VENTOLIN HFA;PROAIR HFA  Inhale 2 puffs Every 4 (Four) Hours As Needed for Wheezing.     brompheniramine-pseudoephedrine-DM 30-2-10 MG/5ML syrup  Take 5 mL by mouth 4 (Four) Times a Day As Needed for Congestion or Cough.           Where to Get Your Medications      You can get these medications from any pharmacy    Bring a paper prescription for each of these medications  · albuterol sulfate  (90 Base) MCG/ACT inhaler  · brompheniramine-pseudoephedrine-DM 30-2-10 MG/5ML syrup

## 2022-12-06 ENCOUNTER — OFFICE (OUTPATIENT)
Dept: URBAN - METROPOLITAN AREA PATHOLOGY 4 | Facility: PATHOLOGY | Age: 52
End: 2022-12-06
Payer: COMMERCIAL

## 2022-12-06 ENCOUNTER — ON CAMPUS - OUTPATIENT (OUTPATIENT)
Dept: URBAN - METROPOLITAN AREA HOSPITAL 2 | Facility: HOSPITAL | Age: 52
End: 2022-12-06
Payer: COMMERCIAL

## 2022-12-06 VITALS
OXYGEN SATURATION: 97 % | DIASTOLIC BLOOD PRESSURE: 94 MMHG | HEART RATE: 89 BPM | DIASTOLIC BLOOD PRESSURE: 85 MMHG | WEIGHT: 255 LBS | RESPIRATION RATE: 17 BRPM | DIASTOLIC BLOOD PRESSURE: 77 MMHG | DIASTOLIC BLOOD PRESSURE: 93 MMHG | HEART RATE: 86 BPM | DIASTOLIC BLOOD PRESSURE: 82 MMHG | SYSTOLIC BLOOD PRESSURE: 120 MMHG | HEART RATE: 98 BPM | HEIGHT: 68 IN | HEART RATE: 77 BPM | OXYGEN SATURATION: 98 % | HEART RATE: 90 BPM | HEART RATE: 96 BPM | DIASTOLIC BLOOD PRESSURE: 98 MMHG | SYSTOLIC BLOOD PRESSURE: 152 MMHG | SYSTOLIC BLOOD PRESSURE: 137 MMHG | RESPIRATION RATE: 16 BRPM | SYSTOLIC BLOOD PRESSURE: 118 MMHG | SYSTOLIC BLOOD PRESSURE: 108 MMHG | OXYGEN SATURATION: 96 % | DIASTOLIC BLOOD PRESSURE: 81 MMHG | HEART RATE: 87 BPM | SYSTOLIC BLOOD PRESSURE: 115 MMHG | TEMPERATURE: 97.8 F | HEART RATE: 85 BPM | SYSTOLIC BLOOD PRESSURE: 123 MMHG | OXYGEN SATURATION: 100 %

## 2022-12-06 DIAGNOSIS — K57.30 DIVERTICULOSIS OF LARGE INTESTINE WITHOUT PERFORATION OR ABS: ICD-10-CM

## 2022-12-06 DIAGNOSIS — R93.3 ABNORMAL FINDINGS ON DIAGNOSTIC IMAGING OF OTHER PARTS OF DI: ICD-10-CM

## 2022-12-06 DIAGNOSIS — D12.2 BENIGN NEOPLASM OF ASCENDING COLON: ICD-10-CM

## 2022-12-06 PROBLEM — K63.5 POLYP OF COLON: Status: ACTIVE | Noted: 2022-12-06

## 2022-12-06 LAB
GI HISTOLOGY: A. UNSPECIFIED: (no result)
GI HISTOLOGY: PDF REPORT: (no result)

## 2022-12-06 PROCEDURE — 45385 COLONOSCOPY W/LESION REMOVAL: CPT | Performed by: INTERNAL MEDICINE

## 2022-12-06 PROCEDURE — 88305 TISSUE EXAM BY PATHOLOGIST: CPT | Performed by: INTERNAL MEDICINE

## 2022-12-06 RX ADMIN — INSULIN LISPRO 10 UNITS: 100 INJECTION, SOLUTION INTRAVENOUS; SUBCUTANEOUS at 12:57

## 2023-01-04 ENCOUNTER — APPOINTMENT (OUTPATIENT)
Dept: GENERAL RADIOLOGY | Facility: HOSPITAL | Age: 53
End: 2023-01-04
Payer: MEDICAID

## 2023-01-04 ENCOUNTER — HOSPITAL ENCOUNTER (OUTPATIENT)
Facility: HOSPITAL | Age: 53
Discharge: HOME | End: 2023-01-04
Attending: EMERGENCY MEDICINE | Admitting: EMERGENCY MEDICINE
Payer: MEDICAID

## 2023-01-04 VITALS
DIASTOLIC BLOOD PRESSURE: 85 MMHG | TEMPERATURE: 98.7 F | WEIGHT: 250 LBS | BODY MASS INDEX: 37.89 KG/M2 | HEIGHT: 68 IN | HEART RATE: 91 BPM | SYSTOLIC BLOOD PRESSURE: 193 MMHG | OXYGEN SATURATION: 99 % | RESPIRATION RATE: 18 BRPM

## 2023-01-04 DIAGNOSIS — M25.521 RIGHT ELBOW PAIN: Primary | ICD-10-CM

## 2023-01-04 PROCEDURE — G0463 HOSPITAL OUTPT CLINIC VISIT: HCPCS | Performed by: EMERGENCY MEDICINE

## 2023-01-04 PROCEDURE — 73080 X-RAY EXAM OF ELBOW: CPT

## 2023-01-04 PROCEDURE — EDLOS: Performed by: EMERGENCY MEDICINE

## 2023-01-04 PROCEDURE — 99213 OFFICE O/P EST LOW 20 MIN: CPT | Performed by: EMERGENCY MEDICINE

## 2023-01-04 RX ORDER — NAPROXEN 500 MG/1
500 TABLET ORAL 2 TIMES DAILY WITH MEALS
Qty: 10 TABLET | Refills: 0 | Status: SHIPPED | OUTPATIENT
Start: 2023-01-04 | End: 2023-01-09

## 2023-01-04 NOTE — FSED PROVIDER NOTE
Subjective   History of Present Illness  Patient here today for right elbow pain.  No injury or trauma but she does do lifting and movement with her right arm quite a bit.  It radiates to and from the right elbow area.  No fevers or chills.        Review of Systems   All other systems reviewed and are negative.      Past Medical History:   Diagnosis Date   • DM (diabetes mellitus), type 2 (HCC)    • History of MRSA infection    • Hyperlipidemia    • Kidney stone    • Torn meniscus     left knee pain- torn meniscus (ABSTRACTED FROM [employer])       No Known Allergies    Past Surgical History:   Procedure Laterality Date   • CYSTOSCOPY W/ LITHOLAPAXY / E[facility]     • FOOT SURGERY Right 2018   • ORIF TIBIA/FIBULA FRACTURES Right     right tib/fib ORIF (ABSTRACTED FROM [employer])   • OTHER SURGICAL HISTORY Right 01/12/2018    right fusion tibiotalocalcaneal (ABSTRACTED FROM [employer])   • TUBAL ABDOMINAL LIGATION  2001       Family History   Problem Relation Age of Onset   • Diabetes Other        Social History     Socioeconomic History   • Marital status:    Tobacco Use   • Smoking status: Every Day   • Smokeless tobacco: Never   Vaping Use   • Vaping Use: Never used   Substance and Sexual Activity   • Alcohol use: No   • Drug use: No   • Sexual activity: Defer           Objective   Physical Exam  Vitals and nursing note reviewed.   Constitutional:       Appearance: Normal appearance.   HENT:      Head: Normocephalic.      Right Ear: External ear normal.      Left Ear: External ear normal.      Nose: Nose normal.   Eyes:      Conjunctiva/sclera: Conjunctivae normal.   Cardiovascular:      Rate and Rhythm: Normal rate.   Pulmonary:      Effort: Pulmonary effort is normal.   Abdominal:      General: There is no distension.   Musculoskeletal:         General: Swelling and tenderness present. No deformity or signs of injury.      Cervical back: Normal range of motion.      Comments: Right elbow pain/tender.  NV intact  RUE   Skin:     Findings: No rash.   Neurological:      Mental Status: She is alert and oriented to person, place, and time.   Psychiatric:         Mood and Affect: Mood normal.         Procedures           ED Course                                           MDM    Final diagnoses:   Right elbow pain       ED Disposition  ED Disposition     ED Disposition   Discharge    Condition   Stable    Comment   --             Abril Hopper, APRN  [address] [facility] [address]  [place] [address]  [phone]          Maximo Birmingham MD  [address] [place] [address] [place] [id number] [phone]               Medication List      New Prescriptions    naproxen 500 MG EC tablet  Commonly known as: EC NAPROSYN  Take 1 tablet by mouth 2 (Two) Times a Day With Meals for 5 days.           Where to Get Your Medications      These medications were sent to [facility], [place] - [address] - [phone]  - [phone] FX  [address] [place] [address]    Phone: [phone]   · naproxen 500 MG EC tablet

## 2023-01-04 NOTE — Clinical Note
[facility] FSED [address] [place] [address] [place] [address]  Phone: [phone]    Poly Jordan was seen and treated in our emergency department on 1/4/2023.  She may return to work on 01/09/2023.         Thank you for choosing [facility].    Angel Crawley MD

## 2023-02-22 ENCOUNTER — HOSPITAL ENCOUNTER (OUTPATIENT)
Facility: HOSPITAL | Age: 53
Discharge: HOME | End: 2023-02-22
Attending: EMERGENCY MEDICINE | Admitting: EMERGENCY MEDICINE
Payer: MEDICAID

## 2023-02-22 VITALS
WEIGHT: 250 LBS | TEMPERATURE: 97.6 F | BODY MASS INDEX: 37.89 KG/M2 | RESPIRATION RATE: 20 BRPM | HEIGHT: 68 IN | HEART RATE: 89 BPM | DIASTOLIC BLOOD PRESSURE: 104 MMHG | OXYGEN SATURATION: 97 % | SYSTOLIC BLOOD PRESSURE: 169 MMHG

## 2023-02-22 DIAGNOSIS — J06.9 VIRAL URI: Primary | ICD-10-CM

## 2023-02-22 LAB
FLUAV SUBTYP SPEC NAA+PROBE: NOT DETECTED
FLUBV RNA ISLT QL NAA+PROBE: NOT DETECTED
SARS-COV-2 RNA RESP QL NAA+PROBE: NOT DETECTED
STREP A PCR: NOT DETECTED

## 2023-02-22 PROCEDURE — 99213 OFFICE O/P EST LOW 20 MIN: CPT | Performed by: NURSE PRACTITIONER

## 2023-02-22 PROCEDURE — 87636 SARSCOV2 & INF A&B AMP PRB: CPT | Performed by: EMERGENCY MEDICINE

## 2023-02-22 PROCEDURE — G0463 HOSPITAL OUTPT CLINIC VISIT: HCPCS | Performed by: NURSE PRACTITIONER

## 2023-02-22 PROCEDURE — 87651 STREP A DNA AMP PROBE: CPT | Performed by: EMERGENCY MEDICINE

## 2023-02-22 NOTE — FSED PROVIDER NOTE
EMERGENCY DEPARTMENT ENCOUNTER      Room Number: 10/10    History is provided by the patient, no translation services needed    HPI:      Narrative: Pt is a 52 y.o. female who presents complaining of sore throat, rhinorrhea, congestion that began yesterday.  Patient states she was at a  over the weekend.  She states one of the individuals tested positive for COVID and she is concerned that she may have a COVID infection.  She had no direct interaction with this individual.  She has been taking over-the-counter medication with some relief in her symptoms.  She states the sore throat resolved spontaneously without intervention.  Her remaining symptoms now are congestion and rhinorrhea.       PMD: Abril Hopper, LIZZETTE    REVIEW OF SYSTEMS  Review of Systems   Constitutional: Negative for activity change, appetite change, chills, diaphoresis, fatigue, fever and unexpected weight change.   HENT: Positive for congestion, postnasal drip and rhinorrhea. Negative for facial swelling, nosebleeds, sinus pressure, sinus pain, sneezing and sore throat.    Respiratory: Positive for cough. Negative for chest tightness and shortness of breath.    Cardiovascular: Negative for chest pain.   Musculoskeletal: Negative for myalgias.   Skin: Negative for color change, pallor, rash and wound.   Allergic/Immunologic: Negative for immunocompromised state.   Neurological: Negative for dizziness, facial asymmetry and headaches.   Hematological: Negative for adenopathy. Does not bruise/bleed easily.         PAST MEDICAL HISTORY  Active Ambulatory Problems     Diagnosis Date Noted   • Ankle pain, right 06/10/2015   • Disorder of ankle 2016   • Avascular necrosis of talus (HCC) 2017   • Arthritis of foot 2017   • Family history of diabetes mellitus 2021   • Foot pain, right 2017   • Mixed hyperlipidemia 2017   • Hypertension, benign 2018   • Knee pain, left 06/10/2015   • Morbid obesity (HCC)  09/23/2016   • Muscle weakness of lower extremity 08/08/2018   • Peroneal tendinitis 01/31/2019   • Osteoarthritis of knee 09/23/2016   • Primary localized osteoarthritis of left knee 11/09/2015   • Tear of medial meniscus of knee 06/24/2015   • Type 2 diabetes mellitus with hyperglycemia, without long-term current use of insulin ([facility]) 12/04/2018   • Type 2 diabetes mellitus without complications ([facility]) 11/09/2017     Resolved Ambulatory Problems     Diagnosis Date Noted   • No Resolved Ambulatory Problems     Past Medical History:   Diagnosis Date   • DM (diabetes mellitus), type 2 ([facility])    • History of MRSA infection    • Hyperlipidemia    • Kidney stone    • Torn meniscus        PAST SURGICAL HISTORY  Past Surgical History:   Procedure Laterality Date   • CYSTOSCOPY W/ LITHOLAPAXY / [facility]     • FOOT SURGERY Right 2018   • ORIF TIBIA/FIBULA FRACTURES Right     right tib/fib ORIF ([facility])   • OTHER SURGICAL HISTORY Right 01/12/2018    right fusion tibiotalocalcaneal ([facility]ED FROM [facility])   • TUBAL ABDOMINAL LIGATION  2001       FAMILY HISTORY  Family History   Problem Relation Age of Onset   • Diabetes Other        SOCIAL HISTORY  Social History     Socioeconomic History   • Marital status:    Tobacco Use   • Smoking status: Every Day   • Smokeless tobacco: Never   Vaping Use   • Vaping Use: Never used   Substance and Sexual Activity   • Alcohol use: No   • Drug use: No   • Sexual activity: Defer       ALLERGIES  Patient has no known allergies.    No current facility-administered medications for this encounter.    Current Outpatient Medications:   •  albuterol sulfate  (90 Base) MCG/ACT inhaler, Inhale 2 puffs Every 4 (Four) Hours As Needed for Wheezing., Disp: 1 g, Rfl: 0  •  atorvastatin (LIPITOR) 10 MG tablet, TAKE ONE (1) TABLET BY MOUTH DAILY, Disp: 30 tablet, Rfl: 1  •  brompheniramine-pseudoephedrine-DM 30-2-10 MG/5ML syrup, Take 5 mL by mouth 4 (Four) Times a Day As  Needed for Congestion or Cough., Disp: 210 mL, Rfl: 0  •  ergocalciferol (ERGOCALCIFEROL) 1.25 MG (77309 UT) capsule, Take 50,000 Units by mouth Daily., Disp: , Rfl:   •  gabapentin (NEURONTIN) 600 MG tablet, Every 6 (Six) Hours., Disp: , Rfl:   •  glucose blood (FREESTYLE LITE) test strip, FREESTYLE LITE TEST STRP, Disp: , Rfl:   •  HYDROcodone-acetaminophen (Lortab)  MG per tablet, Every 8 (Eight) Hours., Disp: , Rfl:   •  Insulin Glargine (BASAGLAR KWIKPEN) 100 UNIT/ML injection pen, Inject 55 Units under the skin into the appropriate area as directed Daily., Disp: , Rfl:   •  Jardiance 10 MG tablet tablet, TAKE ONE (1) TABLET BY MOUTH DAILY, Disp: 30 tablet, Rfl: 1  •  lisinopril-hydrochlorothiazide (PRINZIDE,ZESTORETIC) 20-25 MG per tablet, Daily., Disp: , Rfl:   •  sitaGLIPtin-metFORMIN (Janumet)  MG per tablet, Take 1 tablet by mouth., Disp: , Rfl:     PHYSICAL EXAM  ED Triage Vitals [02/22/23 1353]   Temp Heart Rate Resp BP SpO2   97.6 °F (36.4 °C) 89 20 (!) 169/104 97 %      Temp src Heart Rate Source Patient Position BP Location FiO2 (%)   Oral -- Sitting Right arm --       Physical Exam  Constitutional:       General: She is not in acute distress.     Appearance: Normal appearance. She is well-developed. She is obese. She is not ill-appearing, toxic-appearing or diaphoretic.   HENT:      Head: Normocephalic and atraumatic.      Right Ear: Tympanic membrane, ear canal and external ear normal. No drainage, swelling or tenderness. No middle ear effusion. There is no impacted cerumen. Tympanic membrane is not erythematous.      Left Ear: Tympanic membrane, ear canal and external ear normal. No drainage, swelling or tenderness.  No middle ear effusion. There is no impacted cerumen. Tympanic membrane is not erythematous.      Nose: Congestion and rhinorrhea present.      Mouth/Throat:      Pharynx: No oropharyngeal exudate or posterior oropharyngeal erythema.   Eyes:      Extraocular Movements:  Extraocular movements intact.      Conjunctiva/sclera: Conjunctivae normal.   Cardiovascular:      Rate and Rhythm: Normal rate and regular rhythm.      Pulses: Normal pulses.      Heart sounds: Normal heart sounds.   Pulmonary:      Effort: Pulmonary effort is normal. No respiratory distress.      Breath sounds: Normal breath sounds. No stridor. No wheezing, rhonchi or rales.   Musculoskeletal:         General: Normal range of motion.      Cervical back: Normal range of motion and neck supple.   Lymphadenopathy:      Cervical: No cervical adenopathy.   Skin:     General: Skin is warm and dry.      Capillary Refill: Capillary refill takes less than 2 seconds.      Coloration: Skin is not jaundiced or pale.      Findings: No bruising or erythema.   Neurological:      General: No focal deficit present.      Mental Status: She is alert and oriented to person, place, and time.   Psychiatric:         Mood and Affect: Mood normal.         Behavior: Behavior normal.           LAB RESULTS  Lab Results (last 24 hours)     Procedure Component Value Units Date/Time    Rapid Strep A Screen - Swab, Throat [[id number]]  (Normal) Collected: 02/22/23 1354    Specimen: Swab from Throat Updated: 02/22/23 1420     STREP A PCR Not Detected    COVID-19 and FLU A/B PCR - Swab, Nasopharynx [[id number]]  (Normal) Collected: 02/22/23 1354    Specimen: Swab from Nasopharynx Updated: 02/22/23 1424     COVID19 Not Detected     Influenza A PCR Not Detected     Influenza B PCR Not Detected    Narrative:      Fact sheet for providers: https://www.fda.gov/media/026891/download    Fact sheet for patients: https://www.fda.gov/media/237074/download    Test performed by PCR.            I ordered the above labs and reviewed the results    RADIOLOGY  No Radiology Exams Resulted Within Past 24 Hours    I ordered the above radiologic testing and reviewed the results    PROCEDURES  Procedures      PROGRESS AND CONSULTS  ED Course as of 02/22/23 6045   Wed  Feb 22, 2023   1429 COVID is negative.  Flu is negative.  Strep is negative. [VT]      ED Course User Index  [VT] Mary Guadarrama APRN           MEDICAL DECISION MAKING    MDM     You have been diagnosed with a non-COVID-19 viral illness, supportive care recommended.  Over-the-counter medications for symptomatic relief may include: Mucinex, Sudafed, DayQuil/NyQuil, various nasal sprays.  Over-the-counter supplements including vitamin C, zinc and magnesium may also be helpful.  Rotate Tylenol and/or ibuprofen every 3-4 hours as needed for fever/pain.  Return to ER for any concerns.  Please follow-up with primary care physician in 3 to 4 days, if your symptoms continue, you may require additional treatment at that time.    DIAGNOSIS  Final diagnoses:   Viral URI       Latest Documented Vital Signs:  As of 14:45 EST  BP- (!) 169/104 HR- 89 Temp- 97.6 °F (36.4 °C) (Oral) O2 sat- 97%    DISPOSITION      Discussed pertinent findings with the patient/family.  Patient/Family voiced understanding of need to follow-up for recheck and further testing as needed.  Return to the Emergency Department warnings were given.         Medication List      No changes were made to your prescriptions during this visit.              Follow-up Information     Abril Hopper APRN. Call in 2 days.    Specialty: Family Medicine  Why: As needed, If symptoms worsen  Contact information:  [address] [place] [address] [phone]                           Dictated utilizing Dragon dictation

## 2023-02-22 NOTE — DISCHARGE INSTRUCTIONS
You have been diagnosed with a non-COVID-19 viral illness, supportive care recommended.  Over-the-counter medications for symptomatic relief may include:   Nasal rinse followed by nasal steroids such as nasocort, flonase, or generic.   Over-the-counter supplements including vitamin C, zinc and magnesium may also be helpful.  Rotate Tylenol and/or ibuprofen every 3-4 hours as needed for fever/pain.  Return to ER for any concerns.  Please follow-up with primary care physician in 3 to 4 days, if your symptoms continue, you may require additional treatment at that time.

## 2023-04-17 ENCOUNTER — HOSPITAL ENCOUNTER (EMERGENCY)
Facility: HOSPITAL | Age: 53
Discharge: HOME | End: 2023-04-17
Attending: EMERGENCY MEDICINE | Admitting: EMERGENCY MEDICINE
Payer: MEDICAID

## 2023-04-17 VITALS
TEMPERATURE: 97.9 F | OXYGEN SATURATION: 98 % | HEART RATE: 80 BPM | RESPIRATION RATE: 20 BRPM | BODY MASS INDEX: 38.26 KG/M2 | DIASTOLIC BLOOD PRESSURE: 84 MMHG | HEIGHT: 68 IN | WEIGHT: 252.43 LBS | SYSTOLIC BLOOD PRESSURE: 133 MMHG

## 2023-04-17 DIAGNOSIS — M79.601 RIGHT ARM PAIN: Primary | ICD-10-CM

## 2023-04-17 PROCEDURE — 99283 EMERGENCY DEPT VISIT LOW MDM: CPT

## 2023-04-17 RX ORDER — PREDNISONE 20 MG/1
40 TABLET ORAL DAILY
Qty: 10 TABLET | Refills: 0 | Status: SHIPPED | OUTPATIENT
Start: 2023-04-17 | End: 2023-04-22

## 2023-04-17 NOTE — DISCHARGE INSTRUCTIONS
Follow-up with [facility] as directed.  Return to the emergency room for any new or worsening symptoms or if you have any other questions or concerns.  Take medication as prescribed.  Use wrist splint as needed for comfort.

## 2023-04-17 NOTE — ED PROVIDER NOTES
Subjective   History of Present Illness  Chief complaint: Right arm pain    52-year-old female presents with right arm pain.  Patient states this has been going on for a few months.  She states 2 or 3 months ago she had an injection in her elbow from an orthopedic doctor.  She states they thought it was tendinitis.  She states it was better for a couple weeks but then the pain returned.  She states pain is radiating into her right hand.  Pain is worse with movement.  She states the pain does go up into her shoulder a little bit as well.  She denies any neck pain.  She denies any numbness or weakness.  She denies any injury to the area.  There has been no swelling or redness.  She has not noticed any rash.  She states she is trying to get in with [facility].    History provided by:  Patient      Review of Systems   Constitutional: Negative for fever.   Respiratory: Negative for cough and shortness of breath.    Cardiovascular: Negative for chest pain.   Musculoskeletal:        Right arm pain   Skin: Negative for rash.   Neurological: Negative for weakness and numbness.       Past Medical History:   Diagnosis Date   • DM (diabetes mellitus), type 2    • History of MRSA infection    • Hyperlipidemia    • Kidney stone    • Torn meniscus     left knee pain- torn meniscus ([facility]STRACTED FROM [facility])       No Known Allergies    Past Surgical History:   Procedure Laterality Date   • CYSTOSCOPY W/ LITHOLAPAXY / [facility]     • FOOT SURGERY Right 2018   • ORIF TIBIA/FIBULA FRACTURES Right     right tib/fib ORIF ([facility]STRACTED FROM [facility])   • OTHER SURGICAL HISTORY Right 01/12/2018    right fusion tibiotalocalcaneal ([facility]STRACTED FROM [facility])   • TUBAL ABDOMINAL LIGATION  2001       Family History   Problem Relation Age of Onset   • Diabetes Other        Social History     Socioeconomic History   • Marital status:    Tobacco Use   • Smoking status: Every Day   • Smokeless tobacco: Never   Vaping Use  "  • Vaping Use: Never used   Substance and Sexual Activity   • Alcohol use: No   • Drug use: No   • Sexual activity: Defer       /84 (BP Location: Left arm, Patient Position: Sitting)   Pulse 80   Temp 97.9 °F (36.6 °C) (Oral)   Resp 20   Ht 172.7 cm (68\")   Wt 115 kg (252 lb 6.8 oz)   SpO2 98%   BMI 38.38 kg/m²       Objective   Physical Exam  Vitals and nursing note reviewed.   Constitutional:       Appearance: Normal appearance.   HENT:      Head: Normocephalic and atraumatic.      Mouth/Throat:      Mouth: Mucous membranes are moist.   Cardiovascular:      Rate and Rhythm: Normal rate and regular rhythm.   Pulmonary:      Effort: Pulmonary effort is normal. No respiratory distress.   Musculoskeletal:      Comments: There is tenderness mostly to the ulnar aspect of the right forearm and wrist area.  There is no swelling or erythema.  There is no warmth.  No rash noted.  Good distal pulses and good cap refill throughout.  Strength and sensation are intact.   Skin:     General: Skin is warm and dry.   Neurological:      Mental Status: She is alert and oriented to person, place, and time.         Procedures           ED Course                                           MDM     Patient was placed in a wrist splint and will be given follow-up with [facility].  She will be given a prescription for prednisone.    Final diagnoses:   Right arm pain       ED Disposition  ED Disposition     ED Disposition   Discharge    Condition   Stable    Comment   --             [facility] - [address] [address]  [phone]  Call in 1 day           Medication List      New Prescriptions    predniSONE 20 MG tablet  Commonly known as: DELTASONE  Take 2 tablets by mouth Daily for 5 days.           Where to Get Your Medications      These medications were sent to [place], [place] - [address] [place] [address] - [phone] " [place] - [phone] FX  [address] [place] [address]    Phone: [phone]   · predniSONE 20 MG tablet          Roni Santos MD  04/17/23 3582

## 2023-05-19 NOTE — PROGRESS NOTES
Neurosurgical Consultation    No chief complaint on file.       HPI: ***    Previous treatment:    Past Medical History:   Diagnosis Date   • DM (diabetes mellitus), type 2    • History of MRSA infection    • Hyperlipidemia    • Kidney stone    • Torn meniscus     left knee pain- torn meniscus ([facility]STRACTED FROM [facility])        Past Surgical History:   Procedure Laterality Date   • CYSTOSCOPY W/ LITHOLAPAXY / [facility]     • FOOT SURGERY Right 2018   • ORIF TIBIA/FIBULA FRACTURES Right     right tib/fib ORIF ([facility]STRACTED FROM [facility])   • OTHER SURGICAL HISTORY Right 01/12/2018    right fusion tibiotalocalcaneal (ABSTRACTED FROM [facility])   • TUBAL ABDOMINAL LIGATION  2001        Current Outpatient Medications on File Prior to Visit   Medication Sig Dispense Refill   • albuterol sulfate  (90 Base) MCG/ACT inhaler Inhale 2 puffs Every 4 (Four) Hours As Needed for Wheezing. 1 g 0   • atorvastatin (LIPITOR) 10 MG tablet TAKE ONE (1) TABLET BY MOUTH DAILY 30 tablet 1   • brompheniramine-pseudoephedrine-DM 30-2-10 MG/5ML syrup Take 5 mL by mouth 4 (Four) Times a Day As Needed for Congestion or Cough. 210 mL 0   • ergocalciferol (ERGOCALCIFEROL) 1.25 MG (87398 UT) capsule Take 50,000 Units by mouth Daily.     • gabapentin (NEURONTIN) 600 MG tablet Every 6 (Six) Hours.     • glucose blood (FREESTYLE LITE) test strip FREESTYLE LITE TEST STRP     • HYDROcodone-acetaminophen (Lortab)  MG per tablet Every 8 (Eight) Hours.     • Insulin Glargine (BASAGLAR KWIKPEN) 100 UNIT/ML injection pen Inject 55 Units under the skin into the appropriate area as directed Daily.     • Jardiance 10 MG tablet tablet TAKE ONE (1) TABLET BY MOUTH DAILY 30 tablet 1   • lisinopril-hydrochlorothiazide (PRINZIDE,ZESTORETIC) 20-25 MG per tablet Daily.     • sitaGLIPtin-metFORMIN (Janumet)  MG per tablet Take 1 tablet by mouth.       No current facility-administered medications on file prior to visit.        No Known Allergies      Social History     Socioeconomic History   • Marital status:    Tobacco Use   • Smoking status: Every Day   • Smokeless tobacco: Never   Vaping Use   • Vaping Use: Never used   Substance and Sexual Activity   • Alcohol use: No   • Drug use: No   • Sexual activity: Defer           Review of Systems     Physical Examination:   There were no vitals filed for this visit.   There were no vitals filed for this visit.        Physical Exam     Neurological Exam     Result Review  {The following data was reviewed by (Optional):78220}  {Ambulatory Labs (Optional):66364}  {Data reviewed (Optional):96639:::1}       There are no diagnoses linked to this encounter.     No follow-ups on file.                  Alistair Fitzgerald MD

## 2023-05-23 ENCOUNTER — HOSPITAL ENCOUNTER (OUTPATIENT)
Dept: GENERAL RADIOLOGY | Facility: HOSPITAL | Age: 53
Discharge: HOME | End: 2023-05-23
Admitting: NEUROLOGICAL SURGERY
Payer: MEDICAID

## 2023-05-23 ENCOUNTER — OFFICE VISIT (OUTPATIENT)
Dept: NEUROSURGERY | Facility: CLINIC | Age: 53
End: 2023-05-23
Payer: MEDICAID

## 2023-05-23 VITALS
SYSTOLIC BLOOD PRESSURE: 142 MMHG | DIASTOLIC BLOOD PRESSURE: 91 MMHG | HEIGHT: 68 IN | BODY MASS INDEX: 38.34 KG/M2 | WEIGHT: 253 LBS | HEART RATE: 80 BPM

## 2023-05-23 DIAGNOSIS — M48.062 SPINAL STENOSIS, LUMBAR REGION, WITH NEUROGENIC CLAUDICATION: ICD-10-CM

## 2023-05-23 DIAGNOSIS — M53.3 SACROILIAC JOINT DYSFUNCTION OF LEFT SIDE: ICD-10-CM

## 2023-05-23 DIAGNOSIS — M53.3 SACROILIAC JOINT DYSFUNCTION OF LEFT SIDE: Primary | ICD-10-CM

## 2023-05-23 PROCEDURE — 72114 X-RAY EXAM L-S SPINE BENDING: CPT

## 2023-05-23 NOTE — PROGRESS NOTES
Neurosurgical Consultation      Poly Jordan is a 52 y.o. female is here today as a self referral for low back pain. Today patient reports low back pain traveling into her left leg with numbness,tingling and burning.    Chief Complaint   Patient presents with   • Back Pain     New evaluation        Previous treatment:    HPI: This is a 52-year-old woman with a BMI of 38 as well as a history of diabetes who reports a most recent hemoglobin A1c of greater than 11.  She has orthopedic injuries of her right knee shin and right ankle requiring open reduction and internal fixation.  She is a chronic everyday smoker.  For the last few months she has had increasing lumbar pain.  She does not have a clear initiation for the symptoms.  The pain is in the midline lumbar spine and feels sharp in nature.  This does radiate into the left hip.  She also has burning and tingling predominantly in the left lateral aspect of her calf.  The burning and tingling does occasionally radiate into the top of her foot and into her big toe.  The leg symptoms are intermittent.  The leg symptoms do occur with bending and twisting as well as occasionally with sitting or lying down.  She has utilized ice and heat as well as topical agents without significant improvement.  She does have a pain management doctor who has prescribed her gabapentin and oxycodone for an extended period of time.  She has undergone physical therapy for her lumbar spine without any improvement.  She comments that she is never had any pain management interventions in her lumbar spine including absence of injections.    Past Medical History:   Diagnosis Date   • Arthritis    • Cervical disc disorder    • DM (diabetes mellitus), type 2    • History of MRSA infection    • Hyperlipidemia    • Hypertension    • Kidney stone    • Low back pain    • Torn meniscus     left [facility]        Past Surgical History:   Procedure Laterality  Date   • CYSTOSCOPY W/ LITHOLAPAXY / [facility]     • FOOT SURGERY Right 2018   • ORIF TIBIA/FIBULA FRACTURES Right     right tib/fib ORIF ([facility])   • OTHER SURGICAL HISTORY Right 01/12/2018    right fusion tibiotalocalcaneal (ABSTRACTED FROM [employer])   • TUBAL ABDOMINAL LIGATION  2001        Current Outpatient Medications on File Prior to Visit   Medication Sig Dispense Refill   • albuterol sulfate  (90 Base) MCG/ACT inhaler Inhale 2 puffs Every 4 (Four) Hours As Needed for Wheezing. 1 g 0   • atorvastatin (LIPITOR) 10 MG tablet TAKE ONE (1) TABLET BY MOUTH DAILY 30 tablet 1   • brompheniramine-pseudoephedrine-DM 30-2-10 MG/5ML syrup Take 5 mL by mouth 4 (Four) Times a Day As Needed for Congestion or Cough. 210 mL 0   • ergocalciferol (ERGOCALCIFEROL) 1.25 MG (68178 UT) capsule Take 1 capsule by mouth Daily.     • gabapentin (NEURONTIN) 600 MG tablet Every 6 (Six) Hours.     • glucose blood (FREESTYLE LITE) test strip FREESTYLE LITE TEST STRP     • HYDROcodone-acetaminophen (NORCO)  MG per tablet Every 8 (Eight) Hours.     • Insulin Glargine (BASAGLAR KWIKPEN) 100 UNIT/ML injection pen Inject 55 Units under the skin into the appropriate area as directed Daily.     • Jardiance 10 MG tablet tablet TAKE ONE (1) TABLET BY MOUTH DAILY 30 tablet 1   • lisinopril-hydrochlorothiazide (PRINZIDE,ZESTORETIC) 20-25 MG per tablet Daily.     • sitaGLIPtin-metFORMIN (Janumet)  MG per tablet Take 1 tablet by mouth.       No current facility-administered medications on file prior to visit.        No Known Allergies     Social History     Socioeconomic History   • Marital status:    Tobacco Use   • Smoking status: Every Day     Packs/day: 0.50     Years: 5.00     Pack years: 2.50     Types: Cigarettes   • Smokeless tobacco: Never   Vaping Use   • Vaping Use: Never used   Substance and Sexual Activity   • Alcohol use: No   • Drug use: No   • Sexual activity: Not Currently     Partners: Male  "         Review of Systems   Constitutional: Positive for activity change.   HENT: Negative.    Eyes: Negative.    Respiratory: Negative.    Cardiovascular: Negative.    Gastrointestinal: Negative.    Endocrine: Negative.    Genitourinary: Negative.    Musculoskeletal: Positive for arthralgias, back pain and myalgias.   Skin: Negative.    Allergic/Immunologic: Negative.    Neurological: Positive for numbness (tingling/burning).   Hematological: Negative.    Psychiatric/Behavioral: Positive for sleep disturbance.        Physical Examination:     Vitals:    05/23/23 1131   BP: 142/91   Pulse: 80   Weight: 115 kg (253 lb)   Height: 172.7 cm (68\")   PainSc:   8        Physical Exam  Eyes:      General: Lids are normal.      Extraocular Movements: Extraocular movements intact.      Pupils: Pupils are equal, round, and reactive to light.   Neurological:      Deep Tendon Reflexes:      Reflex Scores:       Patellar reflexes are 0 on the right side and 0 on the left side.       Achilles reflexes are 0 on the right side and 0 on the left side.  Psychiatric:         Speech: Speech normal.          Neurological Exam  Mental Status  Awake, alert and oriented to person, place and time. Speech is normal. Language is fluent with no aphasia.    Cranial Nerves  CN II: Visual fields full to confrontation.  CN III, IV, VI: Extraocular movements intact bilaterally. Normal lids and orbits bilaterally. Pupils equal round and reactive to light bilaterally.  CN V: Facial sensation is normal.  CN VII: Full and symmetric facial movement.  CN IX, X: Palate elevates symmetrically  CN XI: Shoulder shrug strength is normal.  CN XII: Tongue midline without atrophy or fasciculations.    Motor   No fasciculations present. Strength is 5/5 in all four extremities except as noted.  4+ out of 5 strength in left dorsiflexion.  Decreased joint mobility of the right ankle..    Sensory  Left: Loss of sensation in the L5 dermatome.    Reflexes               "                              Right                      Left  Patellar                                0                         0  Achilles                                0                         0    Right pathological reflexes: Jose's absent.  Left pathological reflexes: Jose's absent.     Negative straight leg raise bilaterally.  Positive 3 out of 5 evocative maneuvers for sacroiliac joint dysfunction worse on the left than the right.    Result Review  The following data was reviewed by: Alistair Fitzgerald MD on 05/23/2023:    Data reviewed: Radiologic studies MRI of the lumbar spine does show some moderate lateral recess stenosis at L4-L5.     Assessment/plan:  This is a 52-year-old woman who based on my history and physical exam likely has some component of of left SI joint dysfunction.  She has failed physical therapy.  I recommend a left-sided SI joint injection for diagnostic and therapeutic purposes.  I also feel as though it is possible she may have some component of neurogenic claudication as her leg symptoms do worsen with standing and walking for an extended period of time.  If she does not get significant relief with her SI joint injection then I would recommend an L4-L5 epidural steroid injection.  I have referred her to pain management in order to establish care as well as undergo these interventions.  I also encouraged her to undergo x-rays of the lumbar spine including flexion-extension in order to evaluate for any dynamic instability or indication of significant arthropathy in the SI joint.  She will follow-up with me in approximately 3 months.  I do encourage her to continue to engage with her home exercise plan.  I have encouraged her to call with any questions or concerns.    Diagnoses and all orders for this visit:    1. Sacroiliac joint dysfunction of left side (Primary)  -     Ambulatory Referral to Pain Management Clinic  -     XR Spine Lumbar Complete With Flex & Ext; Future    2.  Spinal stenosis, lumbar region, with neurogenic claudication  -     Ambulatory Referral to Pain Management Clinic  -     XR Spine Lumbar Complete With Flex & Ext; Future         Return in about 3 months (around 8/23/2023).            Alistair Fitzgerald MD

## 2023-05-26 ENCOUNTER — PATIENT ROUNDING (BHMG ONLY) (OUTPATIENT)
Dept: NEUROSURGERY | Facility: CLINIC | Age: 53
End: 2023-05-26
Payer: MEDICAID

## 2023-05-26 NOTE — PROGRESS NOTES
A My-Chart message has been sent to the patient for PATIENT ROUNDING with [facility]

## 2023-08-14 ENCOUNTER — TELEPHONE (OUTPATIENT)
Dept: PAIN MEDICINE | Facility: CLINIC | Age: 53
End: 2023-08-14

## 2023-08-14 NOTE — TELEPHONE ENCOUNTER
Caller: PATIENT    Relationship: SELF     Best call back number: [phone]    What medication are you requesting: HYDROCODONE 10 MG     What are your current symptoms: BILATERAL LEG PAIN AND LUMBAR SPINE PAIN.     How long have you been experiencing symptoms: A FEW YEARS.    Have you had these symptoms before:    [x] Yes  [] No    Have you been treated for these symptoms before:   [x] Yes  [] No    If a prescription is needed, what is your preferred pharmacy and phone number:  [facility] [place], [place] AND THEIR PHONE NUMBER [phone]    Additional notes: PATIENT WAS CALLING TO SEE WHEN DR. VICTORIA WOULD BE ABLE TO SEND HER PRESCRIPTION FOR HYDROCODONE 10 MG IN TO HER PHARMACY. PATIENT STATED SHE WAS PREVIOUSLY GETTING THIS MEDICATION PRESCRIBED BY [facility] BUT SWITCHED HER CARE TO DR. VICTORIA. PATIENT STATED SHE HAS ALREADY DISCUSSED THIS PRESCRIPTION WITH DR. VICTORIA AND HE AGREED TO PRESCRIBING THIS MEDICATION. PATIENT STATED HER PRESCRIPTION IS NORMALLY DUE ON 08.16.23. THANK YOU!

## 2023-08-14 NOTE — TELEPHONE ENCOUNTER
As discussed on 8/8/23.   I will not be able to fill her pain medications. She was prescribed Norco on 6/15/23 for 30 days and UDS was done on 7/17/23. UDS was completely negative for any opioids. Recommend following up with previous pain management for pain meds.     Michael Maciel DO  Pain Management   [facility]

## 2023-08-21 ENCOUNTER — TELEPHONE (OUTPATIENT)
Dept: NEUROSURGERY | Facility: CLINIC | Age: 53
End: 2023-08-21
Payer: MEDICAID

## 2023-08-21 NOTE — TELEPHONE ENCOUNTER
Called patient and informed her that her last OV with Dr. Fitzgerald, he stated he would like her to get her injection. She is scheduled on 8/29 to get her injection so we rescheduled her until after her injection. Patient verbalized understanding.

## 2023-08-29 LAB
ALBUMIN SERPL-MCNC: 4.1 G/DL (ref 3.5–5.2)
ALBUMIN/GLOB SERPL: 1.5 G/DL
ALP SERPL-CCNC: 107 U/L (ref 39–117)
ALT SERPL W P-5'-P-CCNC: 17 U/L (ref 1–33)
ANION GAP SERPL CALCULATED.3IONS-SCNC: 10 MMOL/L (ref 5–15)
AST SERPL-CCNC: 15 U/L (ref 1–32)
BACTERIA UR QL AUTO: ABNORMAL /HPF
BASOPHILS # BLD AUTO: 0.1 10*3/MM3 (ref 0–0.2)
BASOPHILS NFR BLD AUTO: 0.5 % (ref 0–1.5)
BILIRUB SERPL-MCNC: 0.3 MG/DL (ref 0–1.2)
BILIRUB UR QL STRIP: NEGATIVE
BUN SERPL-MCNC: 8 MG/DL (ref 6–20)
BUN/CREAT SERPL: 13.1 (ref 7–25)
CALCIUM SPEC-SCNC: 9.7 MG/DL (ref 8.6–10.5)
CHLORIDE SERPL-SCNC: 103 MMOL/L (ref 98–107)
CLARITY UR: CLEAR
CO2 SERPL-SCNC: 26 MMOL/L (ref 22–29)
COLOR UR: YELLOW
CREAT SERPL-MCNC: 0.61 MG/DL (ref 0.57–1)
DEPRECATED RDW RBC AUTO: 44.2 FL (ref 37–54)
EGFRCR SERPLBLD CKD-EPI 2021: 109.1 ML/MIN/1.73
EOSINOPHIL # BLD AUTO: 0.1 10*3/MM3 (ref 0–0.4)
EOSINOPHIL NFR BLD AUTO: 1.1 % (ref 0.3–6.2)
ERYTHROCYTE [DISTWIDTH] IN BLOOD BY AUTOMATED COUNT: 13.3 % (ref 12.3–15.4)
GLOBULIN UR ELPH-MCNC: 2.7 GM/DL
GLUCOSE SERPL-MCNC: 161 MG/DL (ref 65–99)
GLUCOSE UR STRIP-MCNC: NEGATIVE MG/DL
HCT VFR BLD AUTO: 41.4 % (ref 34–46.6)
HGB BLD-MCNC: 13.9 G/DL (ref 12–15.9)
HGB UR QL STRIP.AUTO: NEGATIVE
HOLD SPECIMEN: NORMAL
HOLD SPECIMEN: NORMAL
HYALINE CASTS UR QL AUTO: ABNORMAL /LPF
KETONES UR QL STRIP: ABNORMAL
LEUKOCYTE ESTERASE UR QL STRIP.AUTO: ABNORMAL
LIPASE SERPL-CCNC: 34 U/L (ref 13–60)
LYMPHOCYTES # BLD AUTO: 3.2 10*3/MM3 (ref 0.7–3.1)
LYMPHOCYTES NFR BLD AUTO: 28.9 % (ref 19.6–45.3)
MCH RBC QN AUTO: 30.7 PG (ref 26.6–33)
MCHC RBC AUTO-ENTMCNC: 33.6 G/DL (ref 31.5–35.7)
MCV RBC AUTO: 91.2 FL (ref 79–97)
MONOCYTES # BLD AUTO: 0.7 10*3/MM3 (ref 0.1–0.9)
MONOCYTES NFR BLD AUTO: 6.2 % (ref 5–12)
NEUTROPHILS NFR BLD AUTO: 63.3 % (ref 42.7–76)
NEUTROPHILS NFR BLD AUTO: 7.1 10*3/MM3 (ref 1.7–7)
NITRITE UR QL STRIP: NEGATIVE
NRBC BLD AUTO-RTO: 0.1 /100 WBC (ref 0–0.2)
PH UR STRIP.AUTO: 5.5 [PH] (ref 5–8)
PLATELET # BLD AUTO: 236 10*3/MM3 (ref 140–450)
PMV BLD AUTO: 9.3 FL (ref 6–12)
POTASSIUM SERPL-SCNC: 3.5 MMOL/L (ref 3.5–5.2)
PROT SERPL-MCNC: 6.8 G/DL (ref 6–8.5)
PROT UR QL STRIP: NEGATIVE
RBC # BLD AUTO: 4.54 10*6/MM3 (ref 3.77–5.28)
RBC # UR STRIP: ABNORMAL /HPF
REF LAB TEST METHOD: ABNORMAL
SODIUM SERPL-SCNC: 139 MMOL/L (ref 136–145)
SP GR UR STRIP: 1.02 (ref 1–1.03)
SQUAMOUS #/AREA URNS HPF: ABNORMAL /HPF
TROPONIN T SERPL HS-MCNC: 8 NG/L
UROBILINOGEN UR QL STRIP: ABNORMAL
WBC # UR STRIP: ABNORMAL /HPF
WBC NRBC COR # BLD: 11.2 10*3/MM3 (ref 3.4–10.8)
WHOLE BLOOD HOLD COAG: NORMAL
WHOLE BLOOD HOLD SPECIMEN: NORMAL

## 2023-08-29 PROCEDURE — 96375 TX/PRO/DX INJ NEW DRUG ADDON: CPT

## 2023-08-29 PROCEDURE — 83690 ASSAY OF LIPASE: CPT

## 2023-08-29 PROCEDURE — 96374 THER/PROPH/DIAG INJ IV PUSH: CPT

## 2023-08-29 PROCEDURE — 84484 ASSAY OF TROPONIN QUANT: CPT

## 2023-08-29 PROCEDURE — 36415 COLL VENOUS BLD VENIPUNCTURE: CPT

## 2023-08-29 PROCEDURE — 80053 COMPREHEN METABOLIC PANEL: CPT

## 2023-08-29 PROCEDURE — 85025 COMPLETE CBC W/AUTO DIFF WBC: CPT

## 2023-08-29 PROCEDURE — 99285 EMERGENCY DEPT VISIT HI MDM: CPT

## 2023-08-29 PROCEDURE — 96361 HYDRATE IV INFUSION ADD-ON: CPT

## 2023-08-29 PROCEDURE — 96376 TX/PRO/DX INJ SAME DRUG ADON: CPT

## 2023-08-29 PROCEDURE — 81001 URINALYSIS AUTO W/SCOPE: CPT

## 2023-08-29 RX ORDER — SODIUM CHLORIDE 0.9 % (FLUSH) 0.9 %
10 SYRINGE (ML) INJECTION AS NEEDED
Status: DISCONTINUED | OUTPATIENT
Start: 2023-08-29 | End: 2023-08-30 | Stop reason: HOSPADM

## 2023-08-30 ENCOUNTER — APPOINTMENT (OUTPATIENT)
Dept: CT IMAGING | Facility: HOSPITAL | Age: 53
End: 2023-08-30
Payer: MEDICAID

## 2023-08-30 ENCOUNTER — HOSPITAL ENCOUNTER (OUTPATIENT)
Facility: HOSPITAL | Age: 53
Setting detail: OBSERVATION
Discharge: HOME | End: 2023-08-30
Attending: EMERGENCY MEDICINE | Admitting: HOSPITALIST
Payer: MEDICAID

## 2023-08-30 VITALS
SYSTOLIC BLOOD PRESSURE: 120 MMHG | HEART RATE: 79 BPM | DIASTOLIC BLOOD PRESSURE: 86 MMHG | RESPIRATION RATE: 16 BRPM | TEMPERATURE: 97.7 F | HEIGHT: 68 IN | BODY MASS INDEX: 39.29 KG/M2 | OXYGEN SATURATION: 93 % | WEIGHT: 259.26 LBS

## 2023-08-30 DIAGNOSIS — K57.92 DIVERTICULITIS: Primary | ICD-10-CM

## 2023-08-30 DIAGNOSIS — N39.0 ACUTE UTI: ICD-10-CM

## 2023-08-30 LAB — GLUCOSE BLDC GLUCOMTR-MCNC: 134 MG/DL (ref 70–105)

## 2023-08-30 PROCEDURE — 74177 CT ABD & PELVIS W/CONTRAST: CPT

## 2023-08-30 PROCEDURE — 96361 HYDRATE IV INFUSION ADD-ON: CPT

## 2023-08-30 PROCEDURE — 25510000001 IOPAMIDOL PER 1 ML: Performed by: EMERGENCY MEDICINE

## 2023-08-30 PROCEDURE — 25010000002 PIPERACILLIN SOD-TAZOBACTAM PER 1 G

## 2023-08-30 PROCEDURE — 25010000002 MORPHINE PER 10 MG

## 2023-08-30 PROCEDURE — G0378 HOSPITAL OBSERVATION PER HR: HCPCS

## 2023-08-30 PROCEDURE — 96375 TX/PRO/DX INJ NEW DRUG ADDON: CPT

## 2023-08-30 PROCEDURE — 82948 REAGENT STRIP/BLOOD GLUCOSE: CPT

## 2023-08-30 PROCEDURE — 96374 THER/PROPH/DIAG INJ IV PUSH: CPT

## 2023-08-30 PROCEDURE — 96376 TX/PRO/DX INJ SAME DRUG ADON: CPT

## 2023-08-30 PROCEDURE — 25010000002 MORPHINE PER 10 MG: Performed by: EMERGENCY MEDICINE

## 2023-08-30 PROCEDURE — 25010000002 ONDANSETRON PER 1 MG: Performed by: EMERGENCY MEDICINE

## 2023-08-30 RX ORDER — INSULIN LISPRO 100 [IU]/ML
2-9 INJECTION, SOLUTION INTRAVENOUS; SUBCUTANEOUS
Status: DISCONTINUED | OUTPATIENT
Start: 2023-08-30 | End: 2023-08-30 | Stop reason: HOSPADM

## 2023-08-30 RX ORDER — OXYCODONE HYDROCHLORIDE 10 MG/1
10 TABLET ORAL EVERY 6 HOURS PRN
Qty: 16 TABLET | Refills: 0 | Status: SHIPPED | OUTPATIENT
Start: 2023-08-30 | End: 2023-09-05

## 2023-08-30 RX ORDER — NALOXONE HYDROCHLORIDE 4 MG/.1ML
SPRAY NASAL
Qty: 2 EACH | Refills: 0 | Status: SHIPPED | OUTPATIENT
Start: 2023-08-30

## 2023-08-30 RX ORDER — SODIUM CHLORIDE 9 MG/ML
100 INJECTION, SOLUTION INTRAVENOUS CONTINUOUS
Status: DISCONTINUED | OUTPATIENT
Start: 2023-08-30 | End: 2023-08-30 | Stop reason: HOSPADM

## 2023-08-30 RX ORDER — OXYCODONE HYDROCHLORIDE 10 MG/1
10 TABLET ORAL EVERY 6 HOURS PRN
Qty: 16 TABLET | Refills: 0 | Status: SHIPPED | OUTPATIENT
Start: 2023-08-30 | End: 2023-08-30 | Stop reason: SDUPTHER

## 2023-08-30 RX ORDER — PIPERACILLIN SODIUM, TAZOBACTAM SODIUM 3; .375 G/15ML; G/15ML
INJECTION, POWDER, LYOPHILIZED, FOR SOLUTION INTRAVENOUS
Status: COMPLETED
Start: 2023-08-30 | End: 2023-08-30

## 2023-08-30 RX ORDER — AMOXICILLIN AND CLAVULANATE POTASSIUM 875; 125 MG/1; MG/1
1 TABLET, FILM COATED ORAL EVERY 12 HOURS SCHEDULED
Status: DISCONTINUED | OUTPATIENT
Start: 2023-08-30 | End: 2023-08-30 | Stop reason: HOSPADM

## 2023-08-30 RX ORDER — AMOXICILLIN AND CLAVULANATE POTASSIUM 875; 125 MG/1; MG/1
1 TABLET, FILM COATED ORAL EVERY 12 HOURS SCHEDULED
Qty: 11 TABLET | Refills: 0 | Status: SHIPPED | OUTPATIENT
Start: 2023-08-30 | End: 2023-09-04 | Stop reason: HOSPADM

## 2023-08-30 RX ORDER — DEXTROSE MONOHYDRATE 25 G/50ML
25 INJECTION, SOLUTION INTRAVENOUS
Status: DISCONTINUED | OUTPATIENT
Start: 2023-08-30 | End: 2023-08-30 | Stop reason: HOSPADM

## 2023-08-30 RX ORDER — NICOTINE POLACRILEX 4 MG
15 LOZENGE BUCCAL
Status: DISCONTINUED | OUTPATIENT
Start: 2023-08-30 | End: 2023-08-30 | Stop reason: HOSPADM

## 2023-08-30 RX ORDER — ONDANSETRON 2 MG/ML
4 INJECTION INTRAMUSCULAR; INTRAVENOUS ONCE
Status: COMPLETED | OUTPATIENT
Start: 2023-08-30 | End: 2023-08-30

## 2023-08-30 RX ORDER — OXYCODONE HYDROCHLORIDE 5 MG/1
10 TABLET ORAL EVERY 6 HOURS PRN
Status: DISCONTINUED | OUTPATIENT
Start: 2023-08-30 | End: 2023-08-30 | Stop reason: HOSPADM

## 2023-08-30 RX ORDER — ONDANSETRON 2 MG/ML
4 INJECTION INTRAMUSCULAR; INTRAVENOUS EVERY 6 HOURS PRN
Status: DISCONTINUED | OUTPATIENT
Start: 2023-08-30 | End: 2023-08-30 | Stop reason: HOSPADM

## 2023-08-30 RX ORDER — IBUPROFEN 600 MG/1
1 TABLET ORAL
Status: DISCONTINUED | OUTPATIENT
Start: 2023-08-30 | End: 2023-08-30 | Stop reason: HOSPADM

## 2023-08-30 RX ADMIN — ONDANSETRON 4 MG: 2 INJECTION INTRAMUSCULAR; INTRAVENOUS at 01:13

## 2023-08-30 RX ADMIN — IOPAMIDOL 100 ML: 755 INJECTION, SOLUTION INTRAVENOUS at 01:40

## 2023-08-30 RX ADMIN — OXYCODONE HYDROCHLORIDE 10 MG: 5 TABLET ORAL at 09:46

## 2023-08-30 RX ADMIN — MORPHINE SULFATE 4 MG: 4 INJECTION INTRAVENOUS at 03:19

## 2023-08-30 RX ADMIN — MORPHINE SULFATE 4 MG: 4 INJECTION, SOLUTION INTRAMUSCULAR; INTRAVENOUS at 03:19

## 2023-08-30 RX ADMIN — PIPERACILLIN AND TAZOBACTAM: 3; .375 INJECTION, POWDER, LYOPHILIZED, FOR SOLUTION INTRAVENOUS at 03:15

## 2023-08-30 RX ADMIN — AMOXICILLIN AND CLAVULANATE POTASSIUM 1 TABLET: 875; 125 TABLET, FILM COATED ORAL at 09:46

## 2023-08-30 RX ADMIN — MORPHINE SULFATE 4 MG: 4 INJECTION, SOLUTION INTRAMUSCULAR; INTRAVENOUS at 01:14

## 2023-08-30 NOTE — DISCHARGE SUMMARY
"             [facility] Medicine Services  Discharge Summary    Date of Service: 23  Patient Name: Poly Jordan  : 1970  MRN: [id number]    Date of Admission: 2023  Discharge Diagnosis: Diverticulitis/UTI  Date of Discharge:  23  Primary Care Physician: Abril Hopper APRN      Presenting Problem:   Diverticulitis [K57.92]    Active and Resolved Hospital Problems:  Active Hospital Problems    Diagnosis POA    **Diverticulitis [K57.92] Yes    Acute UTI (urinary tract infection) [N39.0] Yes    Hypertension, benign [I10] Yes    Type 2 diabetes mellitus with hyperglycemia, without long-term current use of insulin [E11.65] Yes    Mixed hyperlipidemia [E78.2] Yes    Morbid obesity [E66.01] Yes    Osteoarthritis of knee [M17.9] Yes      Resolved Hospital Problems   No resolved problems to display.         Hospital Course     HPI:  Per the H&P written by Amena Pappas, dated 23:  \"Poly Jordan is a very pleasant 52 y.o. female with a past medical history of diabetes mellitus type 2, morbid obesity, hypertension, hyperlipidemia, and diverticulitis in past who presented to [facility] on 2023 complaining of constipation, abdominal pain and nausea without vomiting since yesterday.  She reports she had a bowel movement yesterday without relief.  She reports she thought she had diverticulitis because she has had it recently in the past and it felt the same.  She denies any fevers chills diaphoresis chest pain or dysuria.  Labs today show:Glucose 161 WBC 11.2, urinalysis showing 1+ leukocytes 31-50 WBCs 1+ bacteria     CT abdomen and pelvis per radiology:     \"1. Acute uncomplicated diverticulitis at the sigmoid colon.  2. 6 mm nonobstructing left-sided kidney stone.\"     She was started on IV Zosyn to cover both UTI and diverticulitis, IV fluid hydration, clear liquid diet advance as tolerated and Zofran.  She was given 4 mg IV morphine in ED.  She will be " "admitted for further evaluation and treatment.\"    Hospital Course:  Was provided a short course of IV pain control and zosyn.  Later in the morning of admission, patient stated she was feeling much better.  We transitioned over to PO antibiotics and pain control and the patient was able to tolerate.  As she was controlled with PO pain meds, and her admission was pretty much for pain control, patient was discharged on her current pain regimen to complete her antibiotics at home for both UTI and diverticulitis and to follow with her pcp within 1 week.        DISCHARGE Follow Up Recommendations for labs and diagnostics: None      Reasons For Change In Medications and Indications for New Medications: Pain and infection      Day of Discharge     Vital Signs:  Temp:  [98.6 °F (37 °C)] 98.6 °F (37 °C)  Heart Rate:  [75-92] 76  Resp:  [16] 16  BP: (109-158)/() 120/86    Physical Exam:  Physical Exam  Constitutional:       General: She is not in acute distress.  HENT:      Head: Normocephalic and atraumatic.   Cardiovascular:      Rate and Rhythm: Normal rate and regular rhythm.      Heart sounds: Normal heart sounds.   Pulmonary:      Effort: Pulmonary effort is normal. No respiratory distress.      Breath sounds: Normal breath sounds.   Abdominal:      General: Bowel sounds are normal.      Palpations: Abdomen is soft.      Tenderness: There is abdominal tenderness.   Musculoskeletal:      Right lower leg: No edema.      Left lower leg: No edema.   Skin:     General: Skin is warm and dry.   Neurological:      Mental Status: She is alert.   Psychiatric:         Mood and Affect: Mood normal.         Behavior: Behavior normal.          Pertinent  and/or Most Recent Results     LAB RESULTS:      Lab 08/29/23 2148   WBC 11.20*   HEMOGLOBIN 13.9   HEMATOCRIT 41.4   PLATELETS 236   NEUTROS ABS 7.10*   LYMPHS ABS 3.20*   MONOS ABS 0.70   EOS ABS 0.10   MCV 91.2         Lab 08/29/23 2148   SODIUM 139   POTASSIUM 3.5 "   CHLORIDE 103   CO2 26.0   ANION GAP 10.0   BUN 8   CREATININE 0.61   EGFR 109.1   GLUCOSE 161*   CALCIUM 9.7         Lab 08/29/23 2148   TOTAL PROTEIN 6.8   ALBUMIN 4.1   GLOBULIN 2.7   ALT (SGPT) 17   AST (SGOT) 15   BILIRUBIN 0.3   ALK PHOS 107   LIPASE 34         Lab 08/29/23  2148   HSTROP T 8                 Brief Urine Lab Results  (Last result in the past 365 days)        Color   Clarity   Blood   Leuk Est   Nitrite   Protein   CREAT   Urine HCG        08/29/23 2327 Yellow   Clear   Negative   Small (1+)   Negative   Negative                 Microbiology Results (last 10 days)       ** No results found for the last 240 hours. **            CT Abdomen Pelvis With Contrast    Result Date: 8/30/2023  Impression: Impression: 1. Acute uncomplicated diverticulitis at the sigmoid colon. 2. 6 mm nonobstructing left-sided kidney stone. Electronically Signed: Harsh Elmore MD  8/30/2023 1:55 AM EDT  Workstation ID: [id number]                 Labs Pending at Discharge:      Procedures Performed           Consults:   Consults       No orders found from 8/1/2023 to 8/31/2023.              Discharge Details        Discharge Medications        New Medications        Instructions Start Date   amoxicillin-clavulanate 875-125 MG per tablet  Commonly known as: AUGMENTIN   1 tablet, Oral, Every 12 Hours Scheduled      naloxone 4 MG/0.1ML nasal spray  Commonly known as: NARCAN   Call 911. Don't prime. [place] in 1 nostril for overdose. Repeat in 2-3 minutes in other nostril if no or minimal breathing/responsiveness.      oxyCODONE 10 MG tablet  Commonly known as: ROXICODONE   10 mg, Oral, Every 6 Hours PRN             Continue These Medications        Instructions Start Date   albuterol sulfate  (90 Base) MCG/ACT inhaler  Commonly known as: PROVENTIL HFA;VENTOLIN HFA;PROAIR HFA   2 puffs, Inhalation, Every 4 Hours PRN      atorvastatin 10 MG tablet  Commonly known as: LIPITOR   TAKE ONE (1) TABLET BY MOUTH DAILY       BASAGLAR KWIKPEN 100 UNIT/ML injection pen   55 Units, Subcutaneous, Daily      brompheniramine-pseudoephedrine-DM 30-2-10 MG/5ML syrup   5 mL, Oral, 4 Times Daily PRN      Diclofenac Sodium 1 % gel gel  Commonly known as: VOLTAREN   APPLY TWO (2) grams TOPICALLY TO THE AFFECTED AREA FOUR TIMES DAILY      ergocalciferol 1.25 MG (90223 UT) capsule  Commonly known as: ERGOCALCIFEROL   50,000 Units, Oral, Daily      escitalopram 20 MG tablet  Commonly known as: LEXAPRO   TAKE ONE (1) TABLET BY MOUTH EVERY NIGHT AT BEDTIME      FREESTYLE LITE test strip  Generic drug: glucose blood   FREESTYLE LITE TEST STRP      gabapentin 800 MG tablet  Commonly known as: NEURONTIN   800 mg, Oral, 3 Times Daily      HYDROcodone-acetaminophen  MG per tablet  Commonly known as: NORCO   Every 8 Hours      Janumet  MG per tablet  Generic drug: sitaGLIPtin-metFORMIN   1 tablet, Oral      Jardiance 10 MG tablet tablet  Generic drug: empagliflozin   TAKE ONE (1) TABLET BY MOUTH DAILY      lisinopril-hydrochlorothiazide 20-25 MG per tablet  Commonly known as: PRINZIDE,ZESTORETIC   Every 24 Hours      meloxicam 15 MG tablet  Commonly known as: MOBIC   TAKE ONE-HALF (1/2) TO ONE (1) TABLET BY MOUTH EVERY DAY      tiZANidine 4 MG tablet  Commonly known as: ZANAFLEX                No Known Allergies      Discharge Disposition: Stable  Home or Self Care    Diet:  Hospital:  Diet Order   Procedures    Diet: Liquid Diets; Clear Liquid; Texture: Regular Texture (IDDSI 7); Fluid Consistency: Thin (IDDSI 0)         Discharge Activity:   Activity Instructions       Activity as Tolerated                CODE STATUS:  Code Status and Medical Interventions:   Ordered at: 08/30/23 0420     Code Status (Patient has no pulse and is not breathing):    CPR (Attempt to Resuscitate)     Medical Interventions (Patient has pulse or is breathing):    Full Support         Future Appointments   Date Time Provider Department Center   9/5/2023  3:30 PM  Alistair Fitzgerald MD [facility]   12/5/2023 10:00 AM Gagan Watkins MD [facility]       Additional Instructions for the Follow-ups that You Need to Schedule       Call MD With Problems / Concerns   As directed      Instructions: Please return to care if worsening symptoms.    Order Comments: Instructions: Please return to care if worsening symptoms.         Discharge Follow-up with PCP   As directed       Currently Documented PCP:    Abril Hopper APRN    PCP Phone Number:    [phone]     Follow Up Details: within 1 week                Time spent on Discharge including face to face service:  >30 minutes      Signature: Electronically signed by David Grijalva MD, 08/30/23, 11:36 EDT.  [facility] Hospitalist Team

## 2023-08-30 NOTE — Clinical Note
[facility] EMERGENCY DEPARTMENT  [address] [place] [address]  Phone: [phone]    Poly Jordan was seen and treated in our emergency department on 8/29/2023.  She may return to work on 09/02/2023.         Thank you for choosing [facility].    Melany Kelley, RN

## 2023-08-30 NOTE — Clinical Note
Level of Care: Med/Surg [1]   Diagnosis: Diverticulitis [883254]   Admitting Physician: ROSALINO ALTAMIRANO [[id number]]   Attending Physician: ROSALINO ALTAMIRANO [[id number]]   Bed Request Comments: cardiac monitor

## 2023-08-30 NOTE — ED NOTES
Patient IV placed by RN and will return to the waiting room with Intravenous line in place. Patient has been instructed not to inject anything into the IV, or leave premesis with IV in place. Patient pending further evaluation, treatment, testing / monitoring.

## 2023-08-30 NOTE — H&P
"    [facility] Medicine Services      Patient Name: Poly Jordan  : 1970  MRN: [id number]  Primary Care Physician:  Abril Hopper APRN  Date of admission: 2023      Subjective      Chief Complaint: Abdominal pain    History of Present Illness: Poly Jordan is a very pleasant 52 y.o. female with a past medical history of diabetes mellitus type 2, morbid obesity, hypertension, hyperlipidemia, and diverticulitis in past who presented to [facility] on 2023 complaining of constipation, abdominal pain and nausea without vomiting since yesterday.  She reports she had a bowel movement yesterday without relief.  She reports she thought she had diverticulitis because she has had it recently in the past and it felt the same.  She denies any fevers chills diaphoresis chest pain or dysuria.  Labs today show:Glucose 161 WBC 11.2, urinalysis showing 1+ leukocytes 31-50 WBCs 1+ bacteria    CT abdomen and pelvis per radiology:    \"1. Acute uncomplicated diverticulitis at the sigmoid colon.  2. 6 mm nonobstructing left-sided kidney stone.\"    She was started on IV Zosyn to cover both UTI and diverticulitis, IV fluid hydration, clear liquid diet advance as tolerated and Zofran.  She was given 4 mg IV morphine in ED.  She will be admitted for further evaluation and treatment.  Review of Systems   Constitutional: Negative.   HENT: Negative.     Eyes: Negative.    Cardiovascular: Negative.    Respiratory: Negative.     Endocrine: Negative.    Hematologic/Lymphatic: Negative.    Skin: Negative.    Musculoskeletal: Negative.    Gastrointestinal:  Positive for abdominal pain, constipation and nausea.   Genitourinary: Negative.    Neurological: Negative.    Psychiatric/Behavioral: Negative.     Allergic/Immunologic: Negative.    All other systems reviewed and are negative.     Personal History     Past Medical History:   Diagnosis Date    Arthritis     Back pain     Cervical disc " disorder     Diabetes     DM (diabetes mellitus), type 2     History of MRSA infection     Hyperlipidemia     Hypertension     Hypertension     Kidney stone     Low back pain     Torn meniscus     left knee pain- torn meniscus (ABSTRACTED FROM [facility])       Past Surgical History:   Procedure Laterality Date    CYSTOSCOPY W/ LITHOLAPAXY / [facility]      FOOT SURGERY Right 2018    ORIF TIBIA/FIBULA FRACTURES Right     right tib/fib ORIF (ABSTRACTED FROM [facility])    OTHER SURGICAL HISTORY Right 01/12/2018    right fusion tibiotalocalcaneal (ABSTRACTED FROM [facility])    TUBAL ABDOMINAL LIGATION  2001       Family History: family history includes Diabetes in her mother and another family member; No Known Problems in her father. Otherwise pertinent FHx was reviewed and not pertinent to current issue.    Social History:  reports that she has been smoking cigarettes. She has a 2.50 pack-year smoking history. She has never used smokeless tobacco. She reports that she does not currently use alcohol. She reports that she does not use drugs.    Home Medications:  Prior to Admission Medications       Prescriptions Last Dose Informant Patient Reported? Taking?    albuterol sulfate  (90 Base) MCG/ACT inhaler   No No    Inhale 2 puffs Every 4 (Four) Hours As Needed for Wheezing.    atorvastatin (LIPITOR) 10 MG tablet   No No    TAKE ONE (1) TABLET BY MOUTH DAILY    brompheniramine-pseudoephedrine-DM 30-2-10 MG/5ML syrup   No No    Take 5 mL by mouth 4 (Four) Times a Day As Needed for Congestion or Cough.    Diclofenac Sodium (VOLTAREN) 1 % gel gel   Yes No    APPLY TWO (2) grams TOPICALLY TO THE AFFECTED AREA FOUR TIMES DAILY    ergocalciferol (ERGOCALCIFEROL) 1.25 MG (08196 UT) capsule   Yes No    Take 1 capsule by mouth Daily.    escitalopram (LEXAPRO) 20 MG tablet   Yes No    TAKE ONE (1) TABLET BY MOUTH EVERY NIGHT AT BEDTIME    gabapentin (NEURONTIN) 800 MG tablet   No No    Take 1 tablet by mouth 3 (Three) Times a  Day for 60 days.    glucose blood (FREESTYLE LITE) test strip   Yes No    FREESTYLE LITE TEST STRP    HYDROcodone-acetaminophen (NORCO)  MG per tablet   Yes No    Every 8 (Eight) Hours.    Insulin Glargine (BASAGLAR KWIKPEN) 100 UNIT/ML injection pen   Yes No    Inject 55 Units under the skin into the appropriate area as directed Daily.    Jardiance 10 MG tablet tablet   No No    TAKE ONE (1) TABLET BY MOUTH DAILY    lisinopril-hydrochlorothiazide (PRINZIDE,ZESTORETIC) 20-25 MG per tablet   Yes No    Daily.    meloxicam (MOBIC) 15 MG tablet   Yes No    TAKE ONE-HALF (1/2) TO ONE (1) TABLET BY MOUTH EVERY DAY    sitaGLIPtin-metFORMIN (Janumet)  MG per tablet   Yes No    Take 1 tablet by mouth.    tiZANidine (ZANAFLEX) 4 MG tablet   Yes No              Allergies:  No Known Allergies    Objective      Vitals:   Temp:  [98.6 °F (37 °C)] 98.6 °F (37 °C)  Heart Rate:  [77-92] 81  Resp:  [16] 16  BP: (112-158)/() 130/85    Physical Exam  Vitals reviewed.   Constitutional:       Appearance: Normal appearance. She is obese.   HENT:      Head: Normocephalic and atraumatic.      Right Ear: External ear normal.      Left Ear: External ear normal.      Nose: Nose normal.      Mouth/Throat:      Mouth: Mucous membranes are moist.   Eyes:      Extraocular Movements: Extraocular movements intact.   Cardiovascular:      Rate and Rhythm: Normal rate and regular rhythm.      Pulses: Normal pulses.      Heart sounds: Normal heart sounds.   Pulmonary:      Effort: Pulmonary effort is normal.      Breath sounds: Normal breath sounds.   Abdominal:      General: Bowel sounds are normal.      Palpations: Abdomen is soft.   Genitourinary:     Comments: Deferred  Musculoskeletal:         General: Normal range of motion.      Cervical back: Normal range of motion and neck supple.   Skin:     General: Skin is warm and dry.   Neurological:      General: No focal deficit present.      Mental Status: She is alert and oriented to  "person, place, and time.   Psychiatric:         Mood and Affect: Mood normal.         Behavior: Behavior normal.         Thought Content: Thought content normal.         Judgment: Judgment normal.        Result Review    Result Review:  I have personally reviewed the results from the time of this admission to 8/30/2023 04:29 EDT and agree with these findings:  [x]  Laboratory  []  Microbiology  [x]  Radiology  [x]  EKG/Telemetry   []  Cardiology/Vascular   []  Pathology  [x]  Old records  []  Other:  Most notable findings include: Glucose 161 WBC 11.2, urinalysis showing 1+ leukocytes 31-50 WBCs 1+ bacteria    CT abdomen and pelvis per radiology:    \"1. Acute uncomplicated diverticulitis at the sigmoid colon.  2. 6 mm nonobstructing left-sided kidney stone.\"       Assessment & Plan        Active Hospital Problems:  Active Hospital Problems    Diagnosis     **Diverticulitis     Acute UTI (urinary tract infection)     Type 2 diabetes mellitus with hyperglycemia, without long-term current use of insulin     Hypertension, benign     Mixed hyperlipidemia     Morbid obesity     Osteoarthritis of knee      Plan:     Uncomplicated diverticulitis per CT abdomen and clinical presentation, clear liquid diet advance as tolerated, normal saline at 75 cc/h, IV Zosyn pharmacy to dose    Acute UTI, 1+ bacteria elevated WBC leukocytosis, continue IV Zosyn, normal saline 75 cc/h    Type 2 diabetes mellitus hyperglycemia serum glucose 188, was on home Jardiance, sitagliptin, metformin and glargine insulin hold home p.o. meds while inpatient meds unverified at this time reorder clerkly once verified, add SSI as needed with Accu-Cheks ACHS    Hypertension, was on home lisinopril hydrochlorothiazide reorder pending verification monitor BP Will add as needed antihypertensive needed    Mixed hyperlipidemia, was on of statin reorder pending verification    Morbid obesity BMI 39.42 lifestyle management education    Osteoarthritis of knee " chronic back pain, was on home Zanaflex, gabapentin, and Norco reorder pending verification    Anxiety depression, was on home Lexapro reorder pending verification      DVT prophylaxis:  Mechanical DVT prophylaxis orders are present.    CODE STATUS:    Code Status (Patient has no pulse and is not breathing): CPR (Attempt to Resuscitate)  Medical Interventions (Patient has pulse or is breathing): Full Support    Admission Status:  I believe this patient meets observation status.    I discussed the patient's findings and my recommendations with patient.    This patient has been examined wearing appropriate Personal Protective Equipment     . 08/30/23      Signature: Electronically signed by LIZZETTE Aponte, 08/30/23, 4:32 AM EDT.

## 2023-08-30 NOTE — ED PROVIDER NOTES
Subjective   History of Present Illness  52-year-old female with history of diverticulitis with moderate mid abdominal pain onset yesterday associated with nausea, no fever, patient has had some constipation had a bowel movement earlier which was normal which did not help her pain.    Review of Systems   Gastrointestinal:  Positive for abdominal pain, constipation and nausea.   All other systems reviewed and are negative.    Past Medical History:   Diagnosis Date    Arthritis     Back pain     Cervical disc disorder     Diabetes     DM (diabetes mellitus), type 2     History of MRSA infection     Hyperlipidemia     Hypertension     Hypertension     Kidney stone     Low back pain     Torn meniscus     left knee pain- torn meniscus (ABSTRACTED FROM [facility])       No Known Allergies    Past Surgical History:   Procedure Laterality Date    CYSTOSCOPY W/ LITHOLAPAXY / [facility]      FOOT SURGERY Right 2018    ORIF TIBIA/FIBULA FRACTURES Right     right tib/fib ORIF (ABSTRACTED FROM [facility])    OTHER SURGICAL HISTORY Right 01/12/2018    right fusion tibiotalocalcaneal (ABSTRACTED FROM [facility])    TUBAL ABDOMINAL LIGATION  2001       Family History   Problem Relation Age of Onset    Diabetes Mother     No Known Problems Father     Diabetes Other        Social History     Socioeconomic History    Marital status:    Tobacco Use    Smoking status: Every Day     Packs/day: 0.50     Years: 5.00     Pack years: 2.50     Types: Cigarettes    Smokeless tobacco: Never   Vaping Use    Vaping Use: Never used   Substance and Sexual Activity    Alcohol use: Not Currently     Comment: Rarely    Drug use: No    Sexual activity: Not Currently     Partners: Male           Objective   Physical Exam  Constitutional:       Appearance: Normal appearance.   HENT:      Head: Normocephalic and atraumatic.   Cardiovascular:      Rate and Rhythm: Normal rate and regular rhythm.   Pulmonary:      Effort: Pulmonary effort is normal.       Breath sounds: Normal breath sounds.   Abdominal:      General: Bowel sounds are normal.      Palpations: Abdomen is soft.      Comments: Epigastric tenderness, no lower abdominal tenderness, no rebound or guarding   Musculoskeletal:         General: Normal range of motion.   Skin:     General: Skin is warm and dry.      Capillary Refill: Capillary refill takes less than 2 seconds.   Neurological:      General: No focal deficit present.      Mental Status: She is alert and oriented to person, place, and time.   Psychiatric:         Mood and Affect: Mood normal.         Behavior: Behavior normal.       Procedures           ED Course                                           Medical Decision Making  Patient with acute diverticulitis with moderate ongoing pain, will observe in the hospital, case discussed with Amena who agrees with plan    Problems Addressed:  Acute UTI: complicated acute illness or injury  Diverticulitis: complicated acute illness or injury    Amount and/or Complexity of Data Reviewed  Labs: ordered.  Radiology: ordered.    Risk  Prescription drug management.  Decision regarding hospitalization.        Final diagnoses:   Diverticulitis   Acute UTI       ED Disposition  ED Disposition       ED Disposition   Decision to Admit    Condition   --    Comment   Level of Care: Med/Surg [1]   Diagnosis: Diverticulitis [762746]   Admitting Physician: ROSALINO ALTAMIRANO [[id number]]   Attending Physician: ROSALINO ALTAMIRANO [[id number]]   Bed Request Comments: cardiac monitor                 No follow-up provider specified.       Medication List      No changes were made to your prescriptions during this visit.            William Hdz MD  08/30/23 [id number]

## 2023-09-02 ENCOUNTER — APPOINTMENT (OUTPATIENT)
Dept: CT IMAGING | Facility: HOSPITAL | Age: 53
End: 2023-09-02
Payer: MEDICAID

## 2023-09-02 ENCOUNTER — HOSPITAL ENCOUNTER (OUTPATIENT)
Facility: HOSPITAL | Age: 53
Setting detail: OBSERVATION
Discharge: HOME | End: 2023-09-04
Attending: EMERGENCY MEDICINE | Admitting: HOSPITALIST
Payer: MEDICAID

## 2023-09-02 DIAGNOSIS — K57.92 ACUTE DIVERTICULITIS: Primary | ICD-10-CM

## 2023-09-02 DIAGNOSIS — K59.09 OTHER CONSTIPATION: ICD-10-CM

## 2023-09-02 LAB
ALBUMIN SERPL-MCNC: 3.9 G/DL (ref 3.5–5.2)
ALBUMIN/GLOB SERPL: 1.3 G/DL
ALP SERPL-CCNC: 139 U/L (ref 39–117)
ALT SERPL W P-5'-P-CCNC: 28 U/L (ref 1–33)
ANION GAP SERPL CALCULATED.3IONS-SCNC: 10 MMOL/L (ref 5–15)
AST SERPL-CCNC: 34 U/L (ref 1–32)
BASOPHILS # BLD AUTO: 0.1 10*3/MM3 (ref 0–0.2)
BASOPHILS NFR BLD AUTO: 1 % (ref 0–1.5)
BILIRUB SERPL-MCNC: 0.3 MG/DL (ref 0–1.2)
BILIRUB UR QL STRIP: NEGATIVE
BUN SERPL-MCNC: 8 MG/DL (ref 6–20)
BUN/CREAT SERPL: 13.1 (ref 7–25)
CALCIUM SPEC-SCNC: 9.5 MG/DL (ref 8.6–10.5)
CHLORIDE SERPL-SCNC: 101 MMOL/L (ref 98–107)
CLARITY UR: CLEAR
CO2 SERPL-SCNC: 27 MMOL/L (ref 22–29)
COLOR UR: YELLOW
CREAT SERPL-MCNC: 0.61 MG/DL (ref 0.57–1)
DEPRECATED RDW RBC AUTO: 42.4 FL (ref 37–54)
EGFRCR SERPLBLD CKD-EPI 2021: 109.1 ML/MIN/1.73
EOSINOPHIL # BLD AUTO: 0.2 10*3/MM3 (ref 0–0.4)
EOSINOPHIL NFR BLD AUTO: 1.7 % (ref 0.3–6.2)
ERYTHROCYTE [DISTWIDTH] IN BLOOD BY AUTOMATED COUNT: 13.3 % (ref 12.3–15.4)
GLOBULIN UR ELPH-MCNC: 2.9 GM/DL
GLUCOSE SERPL-MCNC: 230 MG/DL (ref 65–99)
GLUCOSE UR STRIP-MCNC: NEGATIVE MG/DL
HCT VFR BLD AUTO: 39.7 % (ref 34–46.6)
HGB BLD-MCNC: 13.3 G/DL (ref 12–15.9)
HGB UR QL STRIP.AUTO: NEGATIVE
KETONES UR QL STRIP: ABNORMAL
LEUKOCYTE ESTERASE UR QL STRIP.AUTO: ABNORMAL
LIPASE SERPL-CCNC: 36 U/L (ref 13–60)
LYMPHOCYTES # BLD AUTO: 2.7 10*3/MM3 (ref 0.7–3.1)
LYMPHOCYTES NFR BLD AUTO: 29.5 % (ref 19.6–45.3)
MCH RBC QN AUTO: 30.8 PG (ref 26.6–33)
MCHC RBC AUTO-ENTMCNC: 33.5 G/DL (ref 31.5–35.7)
MCV RBC AUTO: 91.9 FL (ref 79–97)
MONOCYTES # BLD AUTO: 0.5 10*3/MM3 (ref 0.1–0.9)
MONOCYTES NFR BLD AUTO: 6 % (ref 5–12)
NEUTROPHILS NFR BLD AUTO: 5.6 10*3/MM3 (ref 1.7–7)
NEUTROPHILS NFR BLD AUTO: 61.8 % (ref 42.7–76)
NITRITE UR QL STRIP: NEGATIVE
NRBC BLD AUTO-RTO: 0 /100 WBC (ref 0–0.2)
PH UR STRIP.AUTO: 7.5 [PH] (ref 5–8)
PLATELET # BLD AUTO: 268 10*3/MM3 (ref 140–450)
PMV BLD AUTO: 8.2 FL (ref 6–12)
POTASSIUM SERPL-SCNC: 3.9 MMOL/L (ref 3.5–5.2)
PROT SERPL-MCNC: 6.8 G/DL (ref 6–8.5)
PROT UR QL STRIP: ABNORMAL
RBC # BLD AUTO: 4.32 10*6/MM3 (ref 3.77–5.28)
SODIUM SERPL-SCNC: 138 MMOL/L (ref 136–145)
SP GR UR STRIP: 1.03 (ref 1–1.03)
UROBILINOGEN UR QL STRIP: ABNORMAL
WBC NRBC COR # BLD: 9 10*3/MM3 (ref 3.4–10.8)

## 2023-09-02 PROCEDURE — 99285 EMERGENCY DEPT VISIT HI MDM: CPT

## 2023-09-02 PROCEDURE — 85025 COMPLETE CBC W/AUTO DIFF WBC: CPT | Performed by: EMERGENCY MEDICINE

## 2023-09-02 PROCEDURE — 25010000002 MORPHINE PER 10 MG: Performed by: EMERGENCY MEDICINE

## 2023-09-02 PROCEDURE — 84100 ASSAY OF PHOSPHORUS: CPT | Performed by: INTERNAL MEDICINE

## 2023-09-02 PROCEDURE — 25010000002 ONDANSETRON PER 1 MG: Performed by: EMERGENCY MEDICINE

## 2023-09-02 PROCEDURE — 74177 CT ABD & PELVIS W/CONTRAST: CPT

## 2023-09-02 PROCEDURE — 96375 TX/PRO/DX INJ NEW DRUG ADDON: CPT

## 2023-09-02 PROCEDURE — 83735 ASSAY OF MAGNESIUM: CPT | Performed by: INTERNAL MEDICINE

## 2023-09-02 PROCEDURE — 36415 COLL VENOUS BLD VENIPUNCTURE: CPT

## 2023-09-02 PROCEDURE — 80053 COMPREHEN METABOLIC PANEL: CPT | Performed by: EMERGENCY MEDICINE

## 2023-09-02 PROCEDURE — 83690 ASSAY OF LIPASE: CPT | Performed by: EMERGENCY MEDICINE

## 2023-09-02 PROCEDURE — 83036 HEMOGLOBIN GLYCOSYLATED A1C: CPT | Performed by: NURSE PRACTITIONER

## 2023-09-02 PROCEDURE — 81001 URINALYSIS AUTO W/SCOPE: CPT | Performed by: EMERGENCY MEDICINE

## 2023-09-02 RX ORDER — ONDANSETRON 2 MG/ML
4 INJECTION INTRAMUSCULAR; INTRAVENOUS ONCE
Status: COMPLETED | OUTPATIENT
Start: 2023-09-02 | End: 2023-09-02

## 2023-09-02 RX ORDER — SODIUM CHLORIDE 0.9 % (FLUSH) 0.9 %
10 SYRINGE (ML) INJECTION AS NEEDED
Status: DISCONTINUED | OUTPATIENT
Start: 2023-09-02 | End: 2023-09-04 | Stop reason: HOSPADM

## 2023-09-02 RX ADMIN — MORPHINE SULFATE 4 MG: 4 INJECTION, SOLUTION INTRAMUSCULAR; INTRAVENOUS at 23:03

## 2023-09-02 RX ADMIN — ONDANSETRON 4 MG: 2 INJECTION INTRAMUSCULAR; INTRAVENOUS at 23:03

## 2023-09-02 RX ADMIN — SODIUM CHLORIDE 1000 ML: 9 INJECTION, SOLUTION INTRAVENOUS at 23:03

## 2023-09-02 NOTE — Clinical Note
Level of Care: Med/Surg [1]   Admitting Physician: ROSALINO ALTAMIRANO [[id number]]   Attending Physician: ROSAILNO ALTAMIRANO [[id number]]  
normal (ped)...

## 2023-09-03 ENCOUNTER — ON CAMPUS - OUTPATIENT (OUTPATIENT)
Dept: URBAN - METROPOLITAN AREA HOSPITAL 85 | Facility: HOSPITAL | Age: 53
End: 2023-09-03
Payer: COMMERCIAL

## 2023-09-03 DIAGNOSIS — Z86.010 PERSONAL HISTORY OF COLONIC POLYPS: ICD-10-CM

## 2023-09-03 DIAGNOSIS — K57.32 DIVERTICULITIS OF LARGE INTESTINE WITHOUT PERFORATION OR ABS: ICD-10-CM

## 2023-09-03 DIAGNOSIS — K59.00 CONSTIPATION, UNSPECIFIED: ICD-10-CM

## 2023-09-03 DIAGNOSIS — R94.5 ABNORMAL RESULTS OF LIVER FUNCTION STUDIES: ICD-10-CM

## 2023-09-03 LAB
BACTERIA UR QL AUTO: ABNORMAL /HPF
GLUCOSE BLDC GLUCOMTR-MCNC: 152 MG/DL (ref 70–105)
GLUCOSE BLDC GLUCOMTR-MCNC: 158 MG/DL (ref 70–105)
GLUCOSE BLDC GLUCOMTR-MCNC: 188 MG/DL (ref 70–105)
GLUCOSE BLDC GLUCOMTR-MCNC: 213 MG/DL (ref 70–105)
HBA1C MFR BLD: 10.3 % (ref 4.8–5.6)
HYALINE CASTS UR QL AUTO: ABNORMAL /LPF
MAGNESIUM SERPL-MCNC: 1.8 MG/DL (ref 1.6–2.6)
MUCOUS THREADS URNS QL MICRO: ABNORMAL /HPF
PHOSPHATE SERPL-MCNC: 3 MG/DL (ref 2.5–4.5)
RBC # UR STRIP: ABNORMAL /HPF
REF LAB TEST METHOD: ABNORMAL
SQUAMOUS #/AREA URNS HPF: ABNORMAL /HPF
WBC # UR STRIP: ABNORMAL /HPF

## 2023-09-03 PROCEDURE — 25010000002 PIPERACILLIN SOD-TAZOBACTAM PER 1 G: Performed by: EMERGENCY MEDICINE

## 2023-09-03 PROCEDURE — 82948 REAGENT STRIP/BLOOD GLUCOSE: CPT

## 2023-09-03 PROCEDURE — G0378 HOSPITAL OBSERVATION PER HR: HCPCS

## 2023-09-03 PROCEDURE — 63710000001 INSULIN LISPRO (HUMAN) PER 5 UNITS: Performed by: NURSE PRACTITIONER

## 2023-09-03 PROCEDURE — 25010000002 HYDROMORPHONE 1 MG/ML SOLUTION: Performed by: EMERGENCY MEDICINE

## 2023-09-03 PROCEDURE — 25510000001 IOPAMIDOL PER 1 ML: Performed by: EMERGENCY MEDICINE

## 2023-09-03 PROCEDURE — 63710000001 ONDANSETRON PER 8 MG: Performed by: NURSE PRACTITIONER

## 2023-09-03 PROCEDURE — 96376 TX/PRO/DX INJ SAME DRUG ADON: CPT

## 2023-09-03 PROCEDURE — 99222 1ST HOSP IP/OBS MODERATE 55: CPT | Performed by: NURSE PRACTITIONER

## 2023-09-03 PROCEDURE — 25010000002 PIPERACILLIN SOD-TAZOBACTAM PER 1 G: Performed by: NURSE PRACTITIONER

## 2023-09-03 PROCEDURE — 96366 THER/PROPH/DIAG IV INF ADDON: CPT

## 2023-09-03 PROCEDURE — 96365 THER/PROPH/DIAG IV INF INIT: CPT

## 2023-09-03 PROCEDURE — 25010000002 HYDROMORPHONE 1 MG/ML SOLUTION: Performed by: NURSE PRACTITIONER

## 2023-09-03 PROCEDURE — 96375 TX/PRO/DX INJ NEW DRUG ADDON: CPT

## 2023-09-03 RX ORDER — AMOXICILLIN 250 MG
2 CAPSULE ORAL 2 TIMES DAILY
Status: DISCONTINUED | OUTPATIENT
Start: 2023-09-03 | End: 2023-09-04 | Stop reason: HOSPADM

## 2023-09-03 RX ORDER — ONDANSETRON 4 MG/1
4 TABLET, FILM COATED ORAL EVERY 6 HOURS PRN
Status: DISCONTINUED | OUTPATIENT
Start: 2023-09-03 | End: 2023-09-04 | Stop reason: HOSPADM

## 2023-09-03 RX ORDER — POLYETHYLENE GLYCOL 3350 17 G/17G
17 POWDER, FOR SOLUTION ORAL 2 TIMES DAILY
Status: DISCONTINUED | OUTPATIENT
Start: 2023-09-03 | End: 2023-09-04 | Stop reason: HOSPADM

## 2023-09-03 RX ORDER — ACETAMINOPHEN 160 MG/5ML
650 SOLUTION ORAL EVERY 4 HOURS PRN
Status: DISCONTINUED | OUTPATIENT
Start: 2023-09-03 | End: 2023-09-04 | Stop reason: HOSPADM

## 2023-09-03 RX ORDER — BISACODYL 10 MG
10 SUPPOSITORY, RECTAL RECTAL ONCE
Status: COMPLETED | OUTPATIENT
Start: 2023-09-03 | End: 2023-09-03

## 2023-09-03 RX ORDER — ACETAMINOPHEN 650 MG/1
650 SUPPOSITORY RECTAL EVERY 4 HOURS PRN
Status: DISCONTINUED | OUTPATIENT
Start: 2023-09-03 | End: 2023-09-04 | Stop reason: HOSPADM

## 2023-09-03 RX ORDER — ALBUTEROL SULFATE 90 UG/1
2 AEROSOL, METERED RESPIRATORY (INHALATION) EVERY 4 HOURS PRN
Status: DISCONTINUED | OUTPATIENT
Start: 2023-09-03 | End: 2023-09-04 | Stop reason: HOSPADM

## 2023-09-03 RX ORDER — ALUMINA, MAGNESIA, AND SIMETHICONE 2400; 2400; 240 MG/30ML; MG/30ML; MG/30ML
15 SUSPENSION ORAL EVERY 6 HOURS PRN
Status: DISCONTINUED | OUTPATIENT
Start: 2023-09-03 | End: 2023-09-04 | Stop reason: HOSPADM

## 2023-09-03 RX ORDER — ACETAMINOPHEN 325 MG/1
650 TABLET ORAL EVERY 4 HOURS PRN
Status: DISCONTINUED | OUTPATIENT
Start: 2023-09-03 | End: 2023-09-04 | Stop reason: HOSPADM

## 2023-09-03 RX ORDER — DICYCLOMINE HYDROCHLORIDE 10 MG/1
10 CAPSULE ORAL 4 TIMES DAILY
Status: DISCONTINUED | OUTPATIENT
Start: 2023-09-03 | End: 2023-09-04 | Stop reason: HOSPADM

## 2023-09-03 RX ORDER — BISACODYL 5 MG/1
5 TABLET, DELAYED RELEASE ORAL DAILY PRN
Status: DISCONTINUED | OUTPATIENT
Start: 2023-09-03 | End: 2023-09-04 | Stop reason: HOSPADM

## 2023-09-03 RX ORDER — NICOTINE POLACRILEX 4 MG
15 LOZENGE BUCCAL
Status: DISCONTINUED | OUTPATIENT
Start: 2023-09-03 | End: 2023-09-04 | Stop reason: HOSPADM

## 2023-09-03 RX ORDER — DEXTROSE MONOHYDRATE 25 G/50ML
25 INJECTION, SOLUTION INTRAVENOUS
Status: DISCONTINUED | OUTPATIENT
Start: 2023-09-03 | End: 2023-09-04 | Stop reason: HOSPADM

## 2023-09-03 RX ORDER — CHOLECALCIFEROL (VITAMIN D3) 125 MCG
5 CAPSULE ORAL NIGHTLY PRN
Status: DISCONTINUED | OUTPATIENT
Start: 2023-09-03 | End: 2023-09-04 | Stop reason: HOSPADM

## 2023-09-03 RX ORDER — POLYETHYLENE GLYCOL 3350 17 G/17G
17 POWDER, FOR SOLUTION ORAL DAILY PRN
Status: DISCONTINUED | OUTPATIENT
Start: 2023-09-03 | End: 2023-09-04 | Stop reason: HOSPADM

## 2023-09-03 RX ORDER — ONDANSETRON 2 MG/ML
4 INJECTION INTRAMUSCULAR; INTRAVENOUS EVERY 6 HOURS PRN
Status: DISCONTINUED | OUTPATIENT
Start: 2023-09-03 | End: 2023-09-04 | Stop reason: HOSPADM

## 2023-09-03 RX ORDER — IBUPROFEN 600 MG/1
1 TABLET ORAL
Status: DISCONTINUED | OUTPATIENT
Start: 2023-09-03 | End: 2023-09-04 | Stop reason: HOSPADM

## 2023-09-03 RX ORDER — INSULIN LISPRO 100 [IU]/ML
2-9 INJECTION, SOLUTION INTRAVENOUS; SUBCUTANEOUS
Status: DISCONTINUED | OUTPATIENT
Start: 2023-09-03 | End: 2023-09-04 | Stop reason: HOSPADM

## 2023-09-03 RX ORDER — BISACODYL 10 MG
10 SUPPOSITORY, RECTAL RECTAL DAILY PRN
Status: DISCONTINUED | OUTPATIENT
Start: 2023-09-03 | End: 2023-09-04 | Stop reason: HOSPADM

## 2023-09-03 RX ORDER — SORBITOL SOLUTION 70 %
100 SOLUTION, ORAL MISCELLANEOUS ONCE
Status: COMPLETED | OUTPATIENT
Start: 2023-09-03 | End: 2023-09-03

## 2023-09-03 RX ORDER — ATORVASTATIN CALCIUM 10 MG/1
10 TABLET, FILM COATED ORAL NIGHTLY
Status: DISCONTINUED | OUTPATIENT
Start: 2023-09-03 | End: 2023-09-04 | Stop reason: HOSPADM

## 2023-09-03 RX ADMIN — DICYCLOMINE HYDROCHLORIDE 10 MG: 10 CAPSULE ORAL at 20:59

## 2023-09-03 RX ADMIN — DICYCLOMINE HYDROCHLORIDE 10 MG: 10 CAPSULE ORAL at 18:24

## 2023-09-03 RX ADMIN — IOPAMIDOL 100 ML: 755 INJECTION, SOLUTION INTRAVENOUS at 00:32

## 2023-09-03 RX ADMIN — PIPERACILLIN AND TAZOBACTAM 3.38 G: 3; .375 INJECTION, POWDER, FOR SOLUTION INTRAVENOUS at 18:24

## 2023-09-03 RX ADMIN — ATORVASTATIN CALCIUM 10 MG: 10 TABLET, FILM COATED ORAL at 20:59

## 2023-09-03 RX ADMIN — HYDROMORPHONE HYDROCHLORIDE 0.5 MG: 1 INJECTION, SOLUTION INTRAMUSCULAR; INTRAVENOUS; SUBCUTANEOUS at 08:02

## 2023-09-03 RX ADMIN — HYDROMORPHONE HYDROCHLORIDE 0.5 MG: 1 INJECTION, SOLUTION INTRAMUSCULAR; INTRAVENOUS; SUBCUTANEOUS at 01:35

## 2023-09-03 RX ADMIN — SENNOSIDES AND DOCUSATE SODIUM 2 TABLET: 50; 8.6 TABLET ORAL at 08:02

## 2023-09-03 RX ADMIN — Medication 10 ML: at 08:02

## 2023-09-03 RX ADMIN — HYDROMORPHONE HYDROCHLORIDE 0.5 MG: 1 INJECTION, SOLUTION INTRAMUSCULAR; INTRAVENOUS; SUBCUTANEOUS at 12:29

## 2023-09-03 RX ADMIN — INSULIN LISPRO 2 UNITS: 100 INJECTION, SOLUTION INTRAVENOUS; SUBCUTANEOUS at 21:48

## 2023-09-03 RX ADMIN — HYDROMORPHONE HYDROCHLORIDE 0.5 MG: 1 INJECTION, SOLUTION INTRAMUSCULAR; INTRAVENOUS; SUBCUTANEOUS at 16:45

## 2023-09-03 RX ADMIN — BISACODYL 10 MG: 10 SUPPOSITORY RECTAL at 14:35

## 2023-09-03 RX ADMIN — INSULIN LISPRO 4 UNITS: 100 INJECTION, SOLUTION INTRAVENOUS; SUBCUTANEOUS at 08:01

## 2023-09-03 RX ADMIN — PIPERACILLIN AND TAZOBACTAM 3.38 G: 3; .375 INJECTION, POWDER, FOR SOLUTION INTRAVENOUS at 08:02

## 2023-09-03 RX ADMIN — HYDROMORPHONE HYDROCHLORIDE 0.5 MG: 1 INJECTION, SOLUTION INTRAMUSCULAR; INTRAVENOUS; SUBCUTANEOUS at 03:51

## 2023-09-03 RX ADMIN — INSULIN LISPRO 2 UNITS: 100 INJECTION, SOLUTION INTRAVENOUS; SUBCUTANEOUS at 18:23

## 2023-09-03 RX ADMIN — HYDROMORPHONE HYDROCHLORIDE 0.5 MG: 1 INJECTION, SOLUTION INTRAMUSCULAR; INTRAVENOUS; SUBCUTANEOUS at 20:58

## 2023-09-03 RX ADMIN — Medication 10 ML: at 20:59

## 2023-09-03 RX ADMIN — ONDANSETRON HYDROCHLORIDE 4 MG: 4 TABLET, FILM COATED ORAL at 21:45

## 2023-09-03 RX ADMIN — PIPERACILLIN AND TAZOBACTAM 3.38 G: 3; .375 INJECTION, POWDER, FOR SOLUTION INTRAVENOUS at 01:12

## 2023-09-03 RX ADMIN — SORBITOL SOLUTION (BULK) 100 ML: 70 SOLUTION at 20:59

## 2023-09-03 RX ADMIN — SENNOSIDES AND DOCUSATE SODIUM 2 TABLET: 50; 8.6 TABLET ORAL at 20:59

## 2023-09-03 NOTE — ED NOTES
Pt has not had a decent bowel movement since Sunday. Pt was diagnosed with diverticulitis on Tuesday. Pt has had this before.  Pt is hurting is mid to lower abdomen on bilateral sides and also in back

## 2023-09-03 NOTE — H&P
[facility] Medicine Services  History & Physical    Patient Name: Poly Jordan  : 1970  MRN: [id number]  Primary Care Physician:  Abril Hopper APRN  Date of admission: 2023    Subjective      Chief Complaint: Abdominal pain    History of Present Illness: Poly Jordan is a 52 y.o. female who presented to [facility] on 2023 complaining of continued abdominal pain after a recent diagnosis of diverticulitis this past Tuesday.  She was admitted here for symptom management and was discharged on Wednesday.  Patient reports she has been compliant with her discharge medications.  She reports that despite being compliant she is steadily become worse.  She believes that most of her pain is due to constipation.  She has no other complaints.      ROS   Constitutional:  Negative for fever.   Respiratory: Negative.     Cardiovascular: Negative.    Gastrointestinal:  Positive for abdominal pain and constipation.   Genitourinary: Negative.    Musculoskeletal:  Positive for back pain.     Personal History     Past Medical History:   Diagnosis Date    Arthritis     Back pain     Cervical disc disorder     Diabetes     DM (diabetes mellitus), type 2     History of MRSA infection     Hyperlipidemia     Hypertension     Hypertension     Kidney stone     Low back pain     Torn meniscus     left knee pain- torn meniscus (ABSTRACTED FROM [facility])       Past Surgical History:   Procedure Laterality Date    CYSTOSCOPY W/ LITHOLAPAXY / [facility]      FOOT SURGERY Right 2018    ORIF TIBIA/FIBULA FRACTURES Right     right tib/fib ORIF (ABSTRACTED FROM [facility])    OTHER SURGICAL HISTORY Right 2018    right fusion tibiotalocalcaneal (ABSTRACTED FROM [facility])    TUBAL ABDOMINAL LIGATION         Family History: family history includes Diabetes in her mother and another family member; No Known Problems in her father. Otherwise pertinent FHx was reviewed and not pertinent to current  issue.    Social History:  reports that she has been smoking cigarettes. She has a 2.50 pack-year smoking history. She has never used smokeless tobacco. She reports that she does not currently use alcohol. She reports that she does not use drugs.    Home Medications:  Prior to Admission Medications       Prescriptions Last Dose Informant Patient Reported? Taking?    albuterol sulfate  (90 Base) MCG/ACT inhaler   No No    Inhale 2 puffs Every 4 (Four) Hours As Needed for Wheezing.    amoxicillin-clavulanate (AUGMENTIN) 875-125 MG per tablet   No No    Take 1 tablet by mouth Every 12 (Twelve) Hours for 11 doses. Indications: Infection Within the Abdomen    atorvastatin (LIPITOR) 10 MG tablet   No No    TAKE ONE (1) TABLET BY MOUTH DAILY    brompheniramine-pseudoephedrine-DM 30-2-10 MG/5ML syrup   No No    Take 5 mL by mouth 4 (Four) Times a Day As Needed for Congestion or Cough.    Diclofenac Sodium (VOLTAREN) 1 % gel gel   Yes No    APPLY TWO (2) grams TOPICALLY TO THE AFFECTED AREA FOUR TIMES DAILY    ergocalciferol (ERGOCALCIFEROL) 1.25 MG (83163 UT) capsule   Yes No    Take 1 capsule by mouth Daily.    escitalopram (LEXAPRO) 20 MG tablet   Yes No    TAKE ONE (1) TABLET BY MOUTH EVERY NIGHT AT BEDTIME    gabapentin (NEURONTIN) 800 MG tablet   No No    Take 1 tablet by mouth 3 (Three) Times a Day for 60 days.    glucose blood (FREESTYLE LITE) test strip   Yes No    FREESTYLE LITE TEST STRP    HYDROcodone-acetaminophen (NORCO)  MG per tablet   Yes No    Every 8 (Eight) Hours.    Insulin Glargine (BASAGLAR KWIKPEN) 100 UNIT/ML injection pen   Yes No    Inject 55 Units under the skin into the appropriate area as directed Daily.    Jardiance 10 MG tablet tablet   No No    TAKE ONE (1) TABLET BY MOUTH DAILY    lisinopril-hydrochlorothiazide (PRINZIDE,ZESTORETIC) 20-25 MG per tablet   Yes No    Daily.    meloxicam (MOBIC) 15 MG tablet   Yes No    TAKE ONE-HALF (1/2) TO ONE (1) TABLET BY MOUTH EVERY DAY     naloxone (NARCAN) 4 MG/0.1ML nasal spray   No No    Call 911. Don't prime. [place] in 1 nostril for overdose. Repeat in 2-3 minutes in other nostril if no or minimal breathing/responsiveness.    oxyCODONE (ROXICODONE) 10 MG tablet   No No    Take 1 tablet by mouth Every 6 (Six) Hours As Needed for Moderate Pain for up to 4 days.    sitaGLIPtin-metFORMIN (Janumet)  MG per tablet   Yes No    Take 1 tablet by mouth.    tiZANidine (ZANAFLEX) 4 MG tablet   Yes No              Allergies:  No Known Allergies    Objective      Vitals:   Temp:  [98.1 °F (36.7 °C)-98.5 °F (36.9 °C)] 98.5 °F (36.9 °C)  Heart Rate:  [] 79  Resp:  [16] 16  BP: (117-156)/(78-94) 127/81    Physical Exam   Vitals and nursing note reviewed.   Constitutional:       Appearance: She is obese.   HENT:      Head: Normocephalic and atraumatic.   Cardiovascular:      Rate and Rhythm: Normal rate and regular rhythm.   Abdominal:      General: Abdomen is protuberant.      Palpations: Abdomen is soft.      Tenderness: There is generalized abdominal tenderness. There is no rebound.   Musculoskeletal:      Cervical back: Normal range of motion.   Skin:     General: Skin is warm and dry.   Neurological:      General: No focal deficit present.      Mental Status: She is alert and oriented to person, place, and time.   Psychiatric:         Mood and Affect: Mood normal.         Thought Content: Thought content normal.     Result Review    Result Review:  I have personally reviewed the results from the time of this admission to 9/3/2023 04:24 EDT and agree with these findings:  [x]  Laboratory  []  Microbiology  [x]  Radiology  []  EKG/Telemetry   []  Cardiology/Vascular   []  Pathology  []  Old records  []  Other:      Assessment & Plan        Active Hospital Problems:  Active Hospital Problems    Diagnosis     **Diverticulitis      Plan:     -Continue Zosyn  -Bowel regimen for constipation  -GI consult due to failure of outpatient treatment, new  inflammatory findings on CT, possible colonoscopy  -N.p.o. for now  -Sliding scale and Accu-Cheks  -Continue to monitor    DVT prophylaxis:  Mechanical DVT prophylaxis orders are present.    CODE STATUS:    Code Status (Patient has no pulse and is not breathing): CPR (Attempt to Resuscitate)  Medical Interventions (Patient has pulse or is breathing): Full Support    Admission Status:  I believe this patient meets patient status.    I discussed the patient's findings and my recommendations with patient.      Signature: Electronically signed by LIZZETTE Deluna, 09/03/23, 04:24 EDT.  [facility] Hospitalist Team

## 2023-09-03 NOTE — ED PROVIDER NOTES
Subjective   History of Present Illness  Chief complaint: Patient is a 52-year-old who is here on Tuesday for diverticulitis.  Discharged on Wednesday.  She has been getting steadily worse.  She has been Augmentin.  She has had some pain medicine Dulcolax and MiraLAX as well.  She has been drinking changes.  She has not had a bowel movement.  She has worsening pain in her abdomen and worsening pain in her back.  She has small amount of bowel movement here and there.  No blood.  No new fever.  But with worsening pain and symptoms she presented here for further evaluation    Context:    Duration:    Timing: As above    Severity: Severe    Associated Symptoms:        PCP:  LMP:    Review of Systems   Constitutional:  Negative for fever.   Respiratory: Negative.     Cardiovascular: Negative.    Gastrointestinal:  Positive for abdominal pain and constipation.   Genitourinary: Negative.    Musculoskeletal:  Positive for back pain.     Past Medical History:   Diagnosis Date    Arthritis     Back pain     Cervical disc disorder     Diabetes     DM (diabetes mellitus), type 2     History of MRSA infection     Hyperlipidemia     Hypertension     Hypertension     Kidney stone     Low back pain     Torn meniscus     left knee pain- torn meniscus (ABSTRACTED FROM [place])       No Known Allergies    Past Surgical History:   Procedure Laterality Date    CYSTOSCOPY W/ LITHOLAPAXY / [facility]      FOOT SURGERY Right 2018    ORIF TIBIA/FIBULA FRACTURES Right     right tib/fib ORIF (ABSTRACTED FROM [place])    OTHER SURGICAL HISTORY Right 01/12/2018    right fusion tibiotalocalcaneal (ABSTRACTED FROM [place])    TUBAL ABDOMINAL LIGATION  2001       Family History   Problem Relation Age of Onset    Diabetes Mother     No Known Problems Father     Diabetes Other        Social History     Socioeconomic History    Marital status:    Tobacco Use    Smoking status: Every Day     Packs/day: 0.50     Years: 5.00     Pack years:  2.50     Types: Cigarettes    Smokeless tobacco: Never   Vaping Use    Vaping Use: Never used   Substance and Sexual Activity    Alcohol use: Not Currently     Comment: Rarely    Drug use: No    Sexual activity: Not Currently     Partners: Male           Objective   Physical Exam  Vitals and nursing note reviewed.   Constitutional:       Appearance: She is obese.   HENT:      Head: Normocephalic and atraumatic.   Cardiovascular:      Rate and Rhythm: Normal rate and regular rhythm.   Abdominal:      General: Abdomen is protuberant.      Palpations: Abdomen is soft.      Tenderness: There is generalized abdominal tenderness. There is no rebound.   Musculoskeletal:      Cervical back: Normal range of motion.   Skin:     General: Skin is warm and dry.   Neurological:      General: No focal deficit present.      Mental Status: She is alert and oriented to person, place, and time.   Psychiatric:         Mood and Affect: Mood normal.         Thought Content: Thought content normal.       Procedures           ED Course      Results for orders placed or performed during the hospital encounter of 09/02/23   Comprehensive Metabolic Panel    Specimen: Blood   Result Value Ref Range    Glucose 230 (H) 65 - 99 mg/dL    BUN 8 6 - 20 mg/dL    Creatinine 0.61 0.57 - 1.00 mg/dL    Sodium 138 136 - 145 mmol/L    Potassium 3.9 3.5 - 5.2 mmol/L    Chloride 101 98 - 107 mmol/L    CO2 27.0 22.0 - 29.0 mmol/L    Calcium 9.5 8.6 - 10.5 mg/dL    Total Protein 6.8 6.0 - 8.5 g/dL    Albumin 3.9 3.5 - 5.2 g/dL    ALT (SGPT) 28 1 - 33 U/L    AST (SGOT) 34 (H) 1 - 32 U/L    Alkaline Phosphatase 139 (H) 39 - 117 U/L    Total Bilirubin 0.3 0.0 - 1.2 mg/dL    Globulin 2.9 gm/dL    A/G Ratio 1.3 g/dL    BUN/Creatinine Ratio 13.1 7.0 - 25.0    Anion Gap 10.0 5.0 - 15.0 mmol/L    eGFR 109.1 >60.0 mL/min/1.73   Lipase    Specimen: Blood   Result Value Ref Range    Lipase 36 13 - 60 U/L   Urinalysis With Culture If Indicated - Urine, Clean Catch     Specimen: Urine, Clean Catch   Result Value Ref Range    Color, UA Yellow Yellow, Straw    Appearance, UA Clear Clear    pH, UA 7.5 5.0 - 8.0    Specific Gravity, UA 1.026 1.005 - 1.030    Glucose, UA Negative Negative    Ketones, UA Trace (A) Negative    Bilirubin, UA Negative Negative    Blood, UA Negative Negative    Protein, UA Trace (A) Negative    Leuk Esterase, UA Trace (A) Negative    Nitrite, UA Negative Negative    Urobilinogen, UA 1.0 E.U./dL 0.2 - 1.0 E.U./dL   CBC Auto Differential    Specimen: Blood   Result Value Ref Range    WBC 9.00 3.40 - 10.80 10*3/mm3    RBC 4.32 3.77 - 5.28 10*6/mm3    Hemoglobin 13.3 12.0 - 15.9 g/dL    Hematocrit 39.7 34.0 - 46.6 %    MCV 91.9 79.0 - 97.0 fL    MCH 30.8 26.6 - 33.0 pg    MCHC 33.5 31.5 - 35.7 g/dL    RDW 13.3 12.3 - 15.4 %    RDW-SD 42.4 37.0 - 54.0 fl    MPV 8.2 6.0 - 12.0 fL    Platelets 268 140 - 450 10*3/mm3    Neutrophil % 61.8 42.7 - 76.0 %    Lymphocyte % 29.5 19.6 - 45.3 %    Monocyte % 6.0 5.0 - 12.0 %    Eosinophil % 1.7 0.3 - 6.2 %    Basophil % 1.0 0.0 - 1.5 %    Neutrophils, Absolute 5.60 1.70 - 7.00 10*3/mm3    Lymphocytes, Absolute 2.70 0.70 - 3.10 10*3/mm3    Monocytes, Absolute 0.50 0.10 - 0.90 10*3/mm3    Eosinophils, Absolute 0.20 0.00 - 0.40 10*3/mm3    Basophils, Absolute 0.10 0.00 - 0.20 10*3/mm3    nRBC 0.0 0.0 - 0.2 /100 WBC   Urinalysis, Microscopic Only - Urine, Clean Catch    Specimen: Urine, Clean Catch   Result Value Ref Range    RBC, UA 0-2 (A) None Seen /HPF    WBC, UA 3-5 (A) None Seen /HPF    Bacteria, UA Trace (A) None Seen /HPF    Squamous Epithelial Cells, UA 7-12 (A) None Seen, 0-2 /HPF    Hyaline Casts, UA None Seen None Seen /LPF    Mucus, UA Trace None Seen, Trace /HPF    Methodology Manual Light Microscopy                               CT Abdomen Pelvis With Contrast    Result Date: 9/3/2023  Impression: 1. Acute colitis/diverticulitis of the sigmoid colon. Area of involvement is slightly more distal than the previous  inflammatory changes seen on CT from 8/30/2023. Previously described inflammatory changes have resolved. Given the configuration, underlying mass cannot be excluded. Colonoscopic correlation recommended. No additional inflammatory changes. No evidence of perforation or focal collection. 2. Ancillary findings as described above. Electronically Signed: Ai Stein MD  9/3/2023 12:36 AM EDT  Workstation ID: [id number]               Medical Decision Making  Patient was seen evaluate for the above problem    Differential diagnosis includes was not limited to perforated diverticulitis, abscess, worsening diverticulitis    Patient with persisting pain and in the inability have a bowel movement did check a CT scan to make sure there is no perforation.  Her area of inflammation had improved but there was another area of inflammation now.  She has been on Augmentin outpatient and there is any worsening area we will plan to readmit to the hospital.  There is question of a mass as well.  She states she has had multiple colonoscopies in the past however making it less likely.  However possible with the inability have a bowel movement.  No small bowel obstruction on CT.  Discussed with patient who verbalized understanding.  Discussed with hospitalist who agrees to admit      Problems Addressed:  Acute diverticulitis: complicated acute illness or injury  Other constipation: complicated acute illness or injury    Amount and/or Complexity of Data Reviewed  Labs: ordered. Decision-making details documented in ED Course.     Details: Reviewed by myself  Radiology: ordered and independent interpretation performed.     Details: Reviewed by myself    Risk  Prescription drug management.  Decision regarding hospitalization.        Final diagnoses:   None   Acute diverticulitis  Constipation  ED Disposition  ED Disposition       None            No follow-up provider specified.       Medication List      No changes were made to your  prescriptions during this visit.            Mookie Méndez,   09/03/23 0103     intact

## 2023-09-03 NOTE — ED NOTES
Nursing report ED to floor  Poly Jordan  52 y.o.  female    HPI:   Chief Complaint   Patient presents with    Constipation     Pt reports she was seen on Tuesday and states has still not been able to have a BM.  Pt reports a very small bowel movement tonight and has used several OTC medications with no relief.        Admitting doctor:   Fani Sandy MD    Admitting diagnosis:   The primary encounter diagnosis was Acute diverticulitis. A diagnosis of Other constipation was also pertinent to this visit.    Code status:   Current Code Status       Date Active Code Status Order ID Comments User Context       Prior            Allergies:   Patient has no known allergies.    Isolation:  No active isolations     Fall Risk:  Fall Risk Assessment was completed, and patient is at low risk for falls.   Predictive Model Details         3 (Low) Factor Value    Calculated 9/3/2023 02:06 Age 52    Risk of Fall Model Musculoskeletal Assessment WDL     Active Peripheral IV Present     Imaging order in this encounter Present     Skin Assessment WDL     Number of Distinct Medication Classes administered 5     Magnesium not on file     Drug Use No     Tobacco Use Current     Rey Scale not on file     Number of administrations of Analgesic Narcotics 2     Peripheral Vascular Assessment WDL     Financial Class Medicaid     Diastolic BP 82     Chloride 101 mmol/L     Albumin 3.9 g/dL     Gastrointestinal Assessment WDL     ALT 28 U/L     Cardiac Assessment WDL     Respiratory Rate 16     Creatinine 0.61 mg/dL     Days after Admission 0.164     Calcium 9.5 mg/dL     Total Bilirubin 0.3 mg/dL     Potassium 3.9 mmol/L         Weight:       09/02/23 2131   Weight: 115 kg (254 lb 6.6 oz)       Intake and Output  No intake or output data in the 24 hours ending 09/03/23 0218    Diet:        Most recent vitals:   Vitals:    09/03/23 0046 09/03/23 0129 09/03/23 0130 09/03/23 0146   BP: 121/78  127/78 118/82   BP Location:       Patient  Position:       Pulse: 86 82 79 76   Resp:       Temp:       TempSrc:       SpO2: 94%  92% 92%   Weight:       Height:           Active LDAs/IV Access:   Lines, Drains & Airways       Active LDAs       Name Placement date Placement time Site Days    Peripheral IV Distal;Left;Posterior Forearm --  --  Forearm  --                    Skin Condition:   Skin Assessments (last day)       None             Labs (abnormal labs have a star):   Labs Reviewed   COMPREHENSIVE METABOLIC PANEL - Abnormal; Notable for the following components:       Result Value    Glucose 230 (*)     AST (SGOT) 34 (*)     Alkaline Phosphatase 139 (*)     All other components within normal limits    Narrative:     GFR Normal >60  Chronic Kidney Disease <60  Kidney Failure <15     URINALYSIS W/ CULTURE IF INDICATED - Abnormal; Notable for the following components:    Ketones, UA Trace (*)     Protein, UA Trace (*)     Leuk Esterase, UA Trace (*)     All other components within normal limits    Narrative:     In absence of clinical symptoms, the presence of pyuria, bacteria, and/or nitrites on the urinalysis result does not correlate with infection.   URINALYSIS, MICROSCOPIC ONLY - Abnormal; Notable for the following components:    RBC, UA 0-2 (*)     WBC, UA 3-5 (*)     Bacteria, UA Trace (*)     Squamous Epithelial Cells, UA 7-12 (*)     All other components within normal limits   LIPASE - Normal   CBC WITH AUTO DIFFERENTIAL - Normal   CBC AND DIFFERENTIAL    Narrative:     The following orders were created for panel order CBC & Differential.  Procedure                               Abnormality         Status                     ---------                               -----------         ------                     CBC Auto Differential[[id number]]        Normal              Final result                 Please view results for these tests on the individual orders.       LOC: Person, Place, Time, and Situation    Telemetry:  Med/Surg    Cardiac  Monitoring Ordered: no    EKG:   No orders to display       Medications Given in the ED:   Medications   sodium chloride 0.9 % flush 10 mL (has no administration in time range)   sodium chloride 0.9 % bolus 1,000 mL (0 mL Intravenous Stopped 9/3/23 0100)   ondansetron (ZOFRAN) injection 4 mg (4 mg Intravenous Given 9/2/23 2303)   morphine injection 4 mg (4 mg Intravenous Given 9/2/23 2303)   iopamidol (ISOVUE-370) 76 % injection 100 mL (100 mL Intravenous Given 9/3/23 0032)   piperacillin-tazobactam (ZOSYN) IVPB 3.375 g in 100 mL NS (CD) (0 g Intravenous Stopped 9/3/23 0137)   HYDROmorphone (DILAUDID) injection 0.5 mg (0.5 mg Intravenous Given 9/3/23 0135)       Imaging results:  CT Abdomen Pelvis With Contrast    Result Date: 9/3/2023  Impression: 1. Acute colitis/diverticulitis of the sigmoid colon. Area of involvement is slightly more distal than the previous inflammatory changes seen on CT from 8/30/2023. Previously described inflammatory changes have resolved. Given the configuration, underlying mass cannot be excluded. Colonoscopic correlation recommended. No additional inflammatory changes. No evidence of perforation or focal collection. 2. Ancillary findings as described above. Electronically Signed: Ai Stein MD  9/3/2023 12:36 AM EDT  Workstation ID: [id number]     Social issues:   Social History     Socioeconomic History    Marital status:    Tobacco Use    Smoking status: Every Day     Packs/day: 0.50     Years: 5.00     Pack years: 2.50     Types: Cigarettes    Smokeless tobacco: Never   Vaping Use    Vaping Use: Never used   Substance and Sexual Activity    Alcohol use: Not Currently     Comment: Rarely    Drug use: No    Sexual activity: Not Currently     Partners: Male       NIH Stroke Scale:  Interval: (not recorded)  1a. Level of Consciousness: (not recorded)  1b. LOC Questions: (not recorded)  1c. LOC Commands: (not recorded)  2. Best Gaze: (not recorded)  3. Visual: (not recorded)  4.  Facial Palsy: (not recorded)  5a. Motor Arm, Left: (not recorded)  5b. Motor Arm, Right: (not recorded)  6a. Motor Leg, Left: (not recorded)  6b. Motor Leg, Right: (not recorded)  7. Limb Ataxia: (not recorded)  8. Sensory: (not recorded)  9. Best Language: (not recorded)  10. Dysarthria: (not recorded)  11. Extinction and Inattention (formerly Neglect): (not recorded)    Total (NIH Stroke Scale): (not recorded)     Additional notable assessment information:     Nursing report ED to floor:  Kamila Delvalle RN   09/03/23 02:18 EDT

## 2023-09-04 VITALS
WEIGHT: 254.41 LBS | DIASTOLIC BLOOD PRESSURE: 83 MMHG | OXYGEN SATURATION: 93 % | SYSTOLIC BLOOD PRESSURE: 130 MMHG | BODY MASS INDEX: 38.56 KG/M2 | RESPIRATION RATE: 10 BRPM | TEMPERATURE: 98.7 F | HEIGHT: 68 IN | HEART RATE: 70 BPM

## 2023-09-04 LAB
ALBUMIN SERPL-MCNC: 3.8 G/DL (ref 3.5–5.2)
ALBUMIN/GLOB SERPL: 1.4 G/DL
ALP SERPL-CCNC: 128 U/L (ref 39–117)
ALT SERPL W P-5'-P-CCNC: 28 U/L (ref 1–33)
ANION GAP SERPL CALCULATED.3IONS-SCNC: 11 MMOL/L (ref 5–15)
AST SERPL-CCNC: 27 U/L (ref 1–32)
BASOPHILS # BLD AUTO: 0 10*3/MM3 (ref 0–0.2)
BASOPHILS NFR BLD AUTO: 0.2 % (ref 0–1.5)
BILIRUB SERPL-MCNC: 0.3 MG/DL (ref 0–1.2)
BUN SERPL-MCNC: 8 MG/DL (ref 6–20)
BUN/CREAT SERPL: 11.9 (ref 7–25)
CALCIUM SPEC-SCNC: 8.8 MG/DL (ref 8.6–10.5)
CHLORIDE SERPL-SCNC: 103 MMOL/L (ref 98–107)
CO2 SERPL-SCNC: 27 MMOL/L (ref 22–29)
CREAT SERPL-MCNC: 0.67 MG/DL (ref 0.57–1)
CRP SERPL-MCNC: 3.93 MG/DL (ref 0–0.5)
DEPRECATED RDW RBC AUTO: 44.2 FL (ref 37–54)
EGFRCR SERPLBLD CKD-EPI 2021: 106.6 ML/MIN/1.73
EOSINOPHIL # BLD AUTO: 0.1 10*3/MM3 (ref 0–0.4)
EOSINOPHIL NFR BLD AUTO: 1.5 % (ref 0.3–6.2)
ERYTHROCYTE [DISTWIDTH] IN BLOOD BY AUTOMATED COUNT: 13.2 % (ref 12.3–15.4)
ERYTHROCYTE [SEDIMENTATION RATE] IN BLOOD: 56 MM/HR (ref 0–30)
GLOBULIN UR ELPH-MCNC: 2.8 GM/DL
GLUCOSE BLDC GLUCOMTR-MCNC: 180 MG/DL (ref 70–105)
GLUCOSE BLDC GLUCOMTR-MCNC: 226 MG/DL (ref 70–105)
GLUCOSE SERPL-MCNC: 161 MG/DL (ref 65–99)
HCT VFR BLD AUTO: 38.5 % (ref 34–46.6)
HGB BLD-MCNC: 12.9 G/DL (ref 12–15.9)
LYMPHOCYTES # BLD AUTO: 2.8 10*3/MM3 (ref 0.7–3.1)
LYMPHOCYTES NFR BLD AUTO: 32.2 % (ref 19.6–45.3)
MAGNESIUM SERPL-MCNC: 1.9 MG/DL (ref 1.6–2.6)
MCH RBC QN AUTO: 30.7 PG (ref 26.6–33)
MCHC RBC AUTO-ENTMCNC: 33.5 G/DL (ref 31.5–35.7)
MCV RBC AUTO: 91.5 FL (ref 79–97)
MONOCYTES # BLD AUTO: 0.6 10*3/MM3 (ref 0.1–0.9)
MONOCYTES NFR BLD AUTO: 7.1 % (ref 5–12)
NEUTROPHILS NFR BLD AUTO: 5.1 10*3/MM3 (ref 1.7–7)
NEUTROPHILS NFR BLD AUTO: 59 % (ref 42.7–76)
NRBC BLD AUTO-RTO: 0 /100 WBC (ref 0–0.2)
PHOSPHATE SERPL-MCNC: 3.4 MG/DL (ref 2.5–4.5)
PLATELET # BLD AUTO: 266 10*3/MM3 (ref 140–450)
PMV BLD AUTO: 8.7 FL (ref 6–12)
POTASSIUM SERPL-SCNC: 3.9 MMOL/L (ref 3.5–5.2)
PROT SERPL-MCNC: 6.6 G/DL (ref 6–8.5)
RBC # BLD AUTO: 4.21 10*6/MM3 (ref 3.77–5.28)
SODIUM SERPL-SCNC: 141 MMOL/L (ref 136–145)
WBC NRBC COR # BLD: 8.6 10*3/MM3 (ref 3.4–10.8)

## 2023-09-04 PROCEDURE — 83735 ASSAY OF MAGNESIUM: CPT | Performed by: INTERNAL MEDICINE

## 2023-09-04 PROCEDURE — G0378 HOSPITAL OBSERVATION PER HR: HCPCS

## 2023-09-04 PROCEDURE — 80053 COMPREHEN METABOLIC PANEL: CPT | Performed by: INTERNAL MEDICINE

## 2023-09-04 PROCEDURE — 82948 REAGENT STRIP/BLOOD GLUCOSE: CPT

## 2023-09-04 PROCEDURE — 25010000002 HYDROMORPHONE 1 MG/ML SOLUTION: Performed by: NURSE PRACTITIONER

## 2023-09-04 PROCEDURE — 84100 ASSAY OF PHOSPHORUS: CPT | Performed by: INTERNAL MEDICINE

## 2023-09-04 PROCEDURE — 85025 COMPLETE CBC W/AUTO DIFF WBC: CPT | Performed by: INTERNAL MEDICINE

## 2023-09-04 PROCEDURE — 25010000002 PIPERACILLIN SOD-TAZOBACTAM PER 1 G: Performed by: NURSE PRACTITIONER

## 2023-09-04 PROCEDURE — 85652 RBC SED RATE AUTOMATED: CPT | Performed by: INTERNAL MEDICINE

## 2023-09-04 PROCEDURE — 63710000001 INSULIN LISPRO (HUMAN) PER 5 UNITS: Performed by: NURSE PRACTITIONER

## 2023-09-04 PROCEDURE — 96376 TX/PRO/DX INJ SAME DRUG ADON: CPT

## 2023-09-04 PROCEDURE — 96366 THER/PROPH/DIAG IV INF ADDON: CPT

## 2023-09-04 PROCEDURE — 86140 C-REACTIVE PROTEIN: CPT | Performed by: INTERNAL MEDICINE

## 2023-09-04 RX ORDER — SORBITOL SOLUTION 70 %
30 SOLUTION, ORAL MISCELLANEOUS DAILY PRN
Qty: 100 ML | Refills: 0 | Status: SHIPPED | OUTPATIENT
Start: 2023-09-04 | End: 2023-09-09

## 2023-09-04 RX ORDER — LEVOFLOXACIN 750 MG/1
750 TABLET ORAL DAILY
Qty: 5 TABLET | Refills: 0 | Status: SHIPPED | OUTPATIENT
Start: 2023-09-04

## 2023-09-04 RX ORDER — METRONIDAZOLE 500 MG/1
500 TABLET ORAL 3 TIMES DAILY
Qty: 15 TABLET | Refills: 0 | Status: SHIPPED | OUTPATIENT
Start: 2023-09-04 | End: 2023-09-09

## 2023-09-04 RX ORDER — ONDANSETRON 4 MG/1
4 TABLET, ORALLY DISINTEGRATING ORAL EVERY 8 HOURS PRN
Qty: 20 TABLET | Refills: 0 | Status: SHIPPED | OUTPATIENT
Start: 2023-09-04

## 2023-09-04 RX ADMIN — PIPERACILLIN AND TAZOBACTAM 3.38 G: 3; .375 INJECTION, POWDER, FOR SOLUTION INTRAVENOUS at 01:00

## 2023-09-04 RX ADMIN — PIPERACILLIN AND TAZOBACTAM 3.38 G: 3; .375 INJECTION, POWDER, FOR SOLUTION INTRAVENOUS at 07:44

## 2023-09-04 RX ADMIN — DICYCLOMINE HYDROCHLORIDE 10 MG: 10 CAPSULE ORAL at 07:44

## 2023-09-04 RX ADMIN — INSULIN LISPRO 2 UNITS: 100 INJECTION, SOLUTION INTRAVENOUS; SUBCUTANEOUS at 07:44

## 2023-09-04 RX ADMIN — POLYETHYLENE GLYCOL 3350 17 G: 17 POWDER, FOR SOLUTION ORAL at 07:44

## 2023-09-04 RX ADMIN — Medication 10 ML: at 07:44

## 2023-09-04 RX ADMIN — INSULIN LISPRO 4 UNITS: 100 INJECTION, SOLUTION INTRAVENOUS; SUBCUTANEOUS at 11:34

## 2023-09-04 RX ADMIN — DICYCLOMINE HYDROCHLORIDE 10 MG: 10 CAPSULE ORAL at 11:34

## 2023-09-04 RX ADMIN — HYDROMORPHONE HYDROCHLORIDE 0.5 MG: 1 INJECTION, SOLUTION INTRAMUSCULAR; INTRAVENOUS; SUBCUTANEOUS at 01:04

## 2023-09-04 RX ADMIN — HYDROMORPHONE HYDROCHLORIDE 0.5 MG: 1 INJECTION, SOLUTION INTRAMUSCULAR; INTRAVENOUS; SUBCUTANEOUS at 07:49

## 2023-09-04 NOTE — PLAN OF CARE
Goal Outcome Evaluation:           Progress: improving  Outcome Evaluation: Patient alert and oriented, able to make wants and needs known. Continue with IV abt. Patient had multiple BM after medication. Patient given PRN medication for pain per MD order.

## 2023-09-04 NOTE — DISCHARGE SUMMARY
[facility]  DISCHARGE SUMMARY    Patient Name: Poly Jordan  : 1970  MRN: [id number]    Discharge condition: Stabilized  Date of Admission: 2023  Discharge Diagnosis: Diverticulitis, constipation  Date of Discharge: 2023  Primary Care Physician: Abril Hopper APRN      Presenting Problem:   Other constipation [K59.09]  Diverticulitis [K57.92]  Acute diverticulitis [K57.92]    Active and Resolved Hospital Problems:  Active Hospital Problems    Diagnosis POA    **Diverticulitis [K57.92] Yes      Resolved Hospital Problems   No resolved problems to display.         Hospital Course     Hospital Course:  Poly Jordan is a 52 y.o. female who presented to the hospital with severe abdominal pain.  The patient was being treated for diverticulitis prior to admission and states it was not improving.  She was admitted for evaluation by gastroenterology.  Patient was provided with laxatives due to profound constipation and sorbitol was given as well.  She was maintained on antibiotics.  After having a large bowel movement the patient felt a lot better and was ready go home.  She was cleared by GI and sent home in stable condition with stable vitals on sorbitol Levaquin and Flagyl.          Reasons For Change In Medications and Indications for New Medications:      Day of Discharge     Vital Signs:  Temp:  [98 °F (36.7 °C)-98.9 °F (37.2 °C)] 98.7 °F (37.1 °C)  Heart Rate:  [70-91] 70  Resp:  [10-25] 10  BP: (121-138)/(82-92) 130/83    Physical Exam:  Physical Exam  Vitals reviewed.   Constitutional:       Comments: Nurse at bedside   HENT:      Head: Normocephalic.      Nose: Nose normal.      Mouth/Throat:      Mouth: Mucous membranes are moist.   Cardiovascular:      Rate and Rhythm: Normal rate and regular rhythm.      Pulses: Normal pulses.      Heart sounds: Normal heart sounds.   Pulmonary:      Effort: Pulmonary effort is normal.      Breath  sounds: Normal breath sounds.   Abdominal:      General: Abdomen is flat.      Palpations: Abdomen is soft.   Musculoskeletal:         General: Normal range of motion.      Cervical back: Normal range of motion.   Skin:     General: Skin is warm.   Neurological:      General: No focal deficit present.      Mental Status: She is alert and oriented to person, place, and time.   Psychiatric:         Mood and Affect: Mood normal.         Behavior: Behavior normal.          Pertinent  and/or Most Recent Results     LAB RESULTS:      Lab 09/04/23 0353 09/02/23 2206 08/29/23 2148   WBC 8.60 9.00 11.20*   HEMOGLOBIN 12.9 13.3 13.9   HEMATOCRIT 38.5 39.7 41.4   PLATELETS 266 268 236   NEUTROS ABS 5.10 5.60 7.10*   LYMPHS ABS 2.80 2.70 3.20*   MONOS ABS 0.60 0.50 0.70   EOS ABS 0.10 0.20 0.10   MCV 91.5 91.9 91.2   SED RATE 56*  --   --    CRP 3.93*  --   --          Lab 09/04/23 0353 09/02/23 2206 08/29/23 2148   SODIUM 141 138 139   POTASSIUM 3.9 3.9 3.5   CHLORIDE 103 101 103   CO2 27.0 27.0 26.0   ANION GAP 11.0 10.0 10.0   BUN 8 8 8   CREATININE 0.67 0.61 0.61   EGFR 106.6 109.1 109.1   GLUCOSE 161* 230* 161*   CALCIUM 8.8 9.5 9.7   MAGNESIUM 1.9 1.8  --    PHOSPHORUS 3.4 3.0  --    HEMOGLOBIN A1C  --  10.30*  --          Lab 09/04/23 0353 09/02/23 2206 08/29/23 2148   TOTAL PROTEIN 6.6 6.8 6.8   ALBUMIN 3.8 3.9 4.1   GLOBULIN 2.8 2.9 2.7   ALT (SGPT) 28 28 17   AST (SGOT) 27 34* 15   BILIRUBIN 0.3 0.3 0.3   ALK PHOS 128* 139* 107   LIPASE  --  36 34         Lab 08/29/23 2148   HSTROP T 8                 Brief Urine Lab Results  (Last result in the past 365 days)        Color   Clarity   Blood   Leuk Est   Nitrite   Protein   CREAT   Urine HCG        09/02/23 2342 Yellow   Clear   Negative   Trace   Negative   Trace                 Microbiology Results (last 10 days)       ** No results found for the last 240 hours. **            CT Abdomen Pelvis With Contrast    Result Date: 9/3/2023  Impression: Impression:  1. Acute colitis/diverticulitis of the sigmoid colon. Area of involvement is slightly more distal than the previous inflammatory changes seen on CT from 8/30/2023. Previously described inflammatory changes have resolved. Given the configuration, underlying mass cannot be excluded. Colonoscopic correlation recommended. No additional inflammatory changes. No evidence of perforation or focal collection. 2. Ancillary findings as described above. Electronically Signed: Ai Stein MD  9/3/2023 12:36 AM EDT  Workstation ID: [id number]    CT Abdomen Pelvis With Contrast    Result Date: 8/30/2023  Impression: Impression: 1. Acute uncomplicated diverticulitis at the sigmoid colon. 2. 6 mm nonobstructing left-sided kidney stone. Electronically Signed: Harsh Elmore MD  8/30/2023 1:55 AM EDT  Workstation ID: [id number]                 Labs Pending at Discharge:      Procedures Performed           Consults:   Consults       Date and Time Order Name Status Description    9/3/2023  1:51 AM Inpatient Gastroenterology Consult      9/3/2023  1:04 AM Hospitalist (on-call MD unless specified)                Discharge Details        Discharge Medications        New Medications        Instructions Start Date   levoFLOXacin 750 MG tablet  Commonly known as: Levaquin   750 mg, Oral, Daily      metroNIDAZOLE 500 MG tablet  Commonly known as: Flagyl   500 mg, Oral, 3 Times Daily      ondansetron ODT 4 MG disintegrating tablet  Commonly known as: ZOFRAN-ODT   4 mg, Translingual, Every 8 Hours PRN      sorbitol 70 % solution   30 mL, Oral, Daily PRN             Continue These Medications        Instructions Start Date   albuterol sulfate  (90 Base) MCG/ACT inhaler  Commonly known as: PROVENTIL HFA;VENTOLIN HFA;PROAIR HFA   2 puffs, Inhalation, Every 4 Hours PRN      atorvastatin 10 MG tablet  Commonly known as: LIPITOR   TAKE ONE (1) TABLET BY MOUTH DAILY      BASAGLAR KWIKPEN 100 UNIT/ML injection pen   55 Units, Subcutaneous, Daily       brompheniramine-pseudoephedrine-DM 30-2-10 MG/5ML syrup   5 mL, Oral, 4 Times Daily PRN      Diclofenac Sodium 1 % gel gel  Commonly known as: VOLTAREN   APPLY TWO (2) grams TOPICALLY TO THE AFFECTED AREA FOUR TIMES DAILY      ergocalciferol 1.25 MG (66162 UT) capsule  Commonly known as: ERGOCALCIFEROL   50,000 Units, Oral, Daily      escitalopram 20 MG tablet  Commonly known as: LEXAPRO   TAKE ONE (1) TABLET BY MOUTH EVERY NIGHT AT BEDTIME      FREESTYLE LITE test strip  Generic drug: glucose blood   FREESTYLE LITE TEST STRP      gabapentin 800 MG tablet  Commonly known as: NEURONTIN   800 mg, Oral, 3 Times Daily      HYDROcodone-acetaminophen  MG per tablet  Commonly known as: NORCO   Every 8 Hours      Janumet  MG per tablet  Generic drug: sitaGLIPtin-metFORMIN   1 tablet, Oral      Jardiance 10 MG tablet tablet  Generic drug: empagliflozin   TAKE ONE (1) TABLET BY MOUTH DAILY      lisinopril-hydrochlorothiazide 20-25 MG per tablet  Commonly known as: PRINZIDE,ZESTORETIC   Every 24 Hours      meloxicam 15 MG tablet  Commonly known as: MOBIC   TAKE ONE-HALF (1/2) TO ONE (1) TABLET BY MOUTH EVERY DAY      naloxone 4 MG/0.1ML nasal spray  Commonly known as: NARCAN   Call 911. Don't prime. [place] in 1 nostril for overdose. Repeat in 2-3 minutes in other nostril if no or minimal breathing/responsiveness.      oxyCODONE 10 MG tablet  Commonly known as: ROXICODONE   10 mg, Oral, Every 6 Hours PRN      tiZANidine 4 MG tablet  Commonly known as: ZANAFLEX              Stop These Medications      amoxicillin-clavulanate 875-125 MG per tablet  Commonly known as: AUGMENTIN              No Known Allergies      Discharge Disposition:   Home or Self Care    Diet:  Hospital:  Diet Order   Procedures    Diet: Gastrointestinal Diets; Fiber-Restricted; Texture: Regular Texture (IDDSI 7); Fluid Consistency: Thin (IDDSI 0)         Discharge Activity:         CODE STATUS:  Code Status and Medical Interventions:   Ordered  at: 09/03/23 0303     Code Status (Patient has no pulse and is not breathing):    CPR (Attempt to Resuscitate)     Medical Interventions (Patient has pulse or is breathing):    Full Support         Future Appointments   Date Time Provider Department Center   12/5/2023 10:00 AM Gagan Watkins MD [facility]           Time spent on Discharge including face to face service:  45 minutes    This patient has been examined wearing appropriate Personal Protective Equipment and discussed with  patient . 09/04/23      Signature: Kirt Gallo MD

## 2023-12-13 ENCOUNTER — OFFICE (OUTPATIENT)
Dept: URBAN - METROPOLITAN AREA CLINIC 64 | Facility: CLINIC | Age: 53
End: 2023-12-13
Payer: COMMERCIAL

## 2023-12-13 VITALS — HEIGHT: 68 IN | WEIGHT: 246 LBS

## 2023-12-13 DIAGNOSIS — K57.92 DIVERTICULITIS OF INTESTINE, PART UNSPECIFIED, WITHOUT PERFO: ICD-10-CM

## 2023-12-13 DIAGNOSIS — K59.00 CONSTIPATION, UNSPECIFIED: ICD-10-CM

## 2023-12-13 PROCEDURE — 99214 OFFICE O/P EST MOD 30 MIN: CPT

## 2023-12-22 ENCOUNTER — SPECIALTY PHARMACY (OUTPATIENT)
Dept: ENDOCRINOLOGY | Facility: CLINIC | Age: 53
End: 2023-12-22
Payer: MEDICAID

## 2023-12-22 ENCOUNTER — LAB (OUTPATIENT)
Dept: LAB | Facility: HOSPITAL | Age: 53
End: 2023-12-22
Payer: MEDICAID

## 2023-12-22 ENCOUNTER — OFFICE VISIT (OUTPATIENT)
Dept: ENDOCRINOLOGY | Facility: CLINIC | Age: 53
End: 2023-12-22
Payer: MEDICAID

## 2023-12-22 VITALS
HEIGHT: 68 IN | SYSTOLIC BLOOD PRESSURE: 125 MMHG | DIASTOLIC BLOOD PRESSURE: 80 MMHG | WEIGHT: 244 LBS | BODY MASS INDEX: 36.98 KG/M2

## 2023-12-22 DIAGNOSIS — E11.65 TYPE 2 DIABETES MELLITUS WITH HYPERGLYCEMIA, WITHOUT LONG-TERM CURRENT USE OF INSULIN: Primary | ICD-10-CM

## 2023-12-22 DIAGNOSIS — E11.65 TYPE 2 DIABETES MELLITUS WITH HYPERGLYCEMIA, WITHOUT LONG-TERM CURRENT USE OF INSULIN: ICD-10-CM

## 2023-12-22 DIAGNOSIS — I10 HYPERTENSION, BENIGN: ICD-10-CM

## 2023-12-22 DIAGNOSIS — E66.01 CLASS 2 SEVERE OBESITY DUE TO EXCESS CALORIES WITH SERIOUS COMORBIDITY AND BODY MASS INDEX (BMI) OF 37.0 TO 37.9 IN ADULT: ICD-10-CM

## 2023-12-22 DIAGNOSIS — E78.2 MIXED HYPERLIPIDEMIA: ICD-10-CM

## 2023-12-22 LAB
ALBUMIN SERPL-MCNC: 4.5 G/DL (ref 3.5–5.2)
ALBUMIN UR-MCNC: 5.8 MG/DL
ALBUMIN/GLOB SERPL: 1.9 G/DL
ALP SERPL-CCNC: 97 U/L (ref 39–117)
ALT SERPL W P-5'-P-CCNC: 22 U/L (ref 1–33)
ANION GAP SERPL CALCULATED.3IONS-SCNC: 14 MMOL/L (ref 5–15)
AST SERPL-CCNC: 17 U/L (ref 1–32)
BILIRUB SERPL-MCNC: 0.2 MG/DL (ref 0–1.2)
BUN SERPL-MCNC: 11 MG/DL (ref 6–20)
BUN/CREAT SERPL: 15.1 (ref 7–25)
CALCIUM SPEC-SCNC: 9.3 MG/DL (ref 8.6–10.5)
CHLORIDE SERPL-SCNC: 99 MMOL/L (ref 98–107)
CHOLEST SERPL-MCNC: 263 MG/DL (ref 0–200)
CO2 SERPL-SCNC: 23 MMOL/L (ref 22–29)
CREAT SERPL-MCNC: 0.73 MG/DL (ref 0.57–1)
CREAT UR-MCNC: 175.2 MG/DL
EGFRCR SERPLBLD CKD-EPI 2021: 99.7 ML/MIN/1.73
GLOBULIN UR ELPH-MCNC: 2.4 GM/DL
GLUCOSE BLDC GLUCOMTR-MCNC: 288 MG/DL (ref 70–105)
GLUCOSE SERPL-MCNC: 319 MG/DL (ref 65–99)
HBA1C MFR BLD: 12.8 % (ref 4.8–5.6)
HDLC SERPL-MCNC: 41 MG/DL (ref 40–60)
LDLC SERPL CALC-MCNC: 162 MG/DL (ref 0–100)
LDLC/HDLC SERPL: 3.85 {RATIO}
MICROALBUMIN/CREAT UR: 33.1 MG/G (ref 0–29)
POTASSIUM SERPL-SCNC: 3.9 MMOL/L (ref 3.5–5.2)
PROT SERPL-MCNC: 6.9 G/DL (ref 6–8.5)
SODIUM SERPL-SCNC: 136 MMOL/L (ref 136–145)
TRIGL SERPL-MCNC: 320 MG/DL (ref 0–150)
VLDLC SERPL-MCNC: 60 MG/DL (ref 5–40)

## 2023-12-22 PROCEDURE — 80061 LIPID PANEL: CPT

## 2023-12-22 PROCEDURE — 80053 COMPREHEN METABOLIC PANEL: CPT

## 2023-12-22 PROCEDURE — 82570 ASSAY OF URINE CREATININE: CPT

## 2023-12-22 PROCEDURE — 83036 HEMOGLOBIN GLYCOSYLATED A1C: CPT

## 2023-12-22 PROCEDURE — 82948 REAGENT STRIP/BLOOD GLUCOSE: CPT | Performed by: INTERNAL MEDICINE

## 2023-12-22 PROCEDURE — 82043 UR ALBUMIN QUANTITATIVE: CPT

## 2023-12-22 PROCEDURE — 36415 COLL VENOUS BLD VENIPUNCTURE: CPT

## 2023-12-22 RX ORDER — SEMAGLUTIDE 0.68 MG/ML
0.5 INJECTION, SOLUTION SUBCUTANEOUS WEEKLY
Qty: 3 ML | Refills: 5 | Status: SHIPPED | OUTPATIENT
Start: 2023-12-22

## 2023-12-22 RX ORDER — SEMAGLUTIDE 0.68 MG/ML
0.25 INJECTION, SOLUTION SUBCUTANEOUS WEEKLY
Qty: 3 ML | Refills: 0 | Status: SHIPPED | OUTPATIENT
Start: 2023-12-22

## 2023-12-22 RX ORDER — METRONIDAZOLE 500 MG/1
TABLET ORAL
COMMUNITY
End: 2023-12-22

## 2023-12-22 RX ORDER — NAPROXEN 250 MG/1
TABLET ORAL
COMMUNITY
End: 2023-12-22

## 2023-12-22 RX ORDER — ERGOCALCIFEROL 1.25 MG/1
50000 CAPSULE ORAL WEEKLY
Status: SHIPPED | COMMUNITY
Start: 2023-12-22

## 2023-12-22 RX ORDER — INSULIN GLARGINE 100 [IU]/ML
INJECTION, SOLUTION SUBCUTANEOUS
Qty: 30 ML | Refills: 6 | Status: SHIPPED | OUTPATIENT
Start: 2023-12-22

## 2023-12-22 NOTE — PROGRESS NOTES
Specialty Pharmacy       Poly Jordan is a 53 y.o. female seen by an Endocrinology provider for Type 2 Diabetes.    Benefits Investigation Summary    Prescription: New Therapy- mounjaro    PLAN: RX [place] [employer]  BIN: [id number]  PCN: [id number]  RX GROUP: [id number]    Prior Auth and Med Assistance notes: Prior authorization submitted for mounjaro, which was denied. Insurance requires patient to try and fail alternative GLP-1 therapies first. Sending updated prescriptions for ozempic 0.25 mg Subq weekly for 4 weeks, then increase to 0.5 mg weekly if able to tolerate.    Patient received general counseling in clinic and additional verbal counseling over the phone about ozempic. Answered all questions and concerns at this time.    Ozempic® (semaglutide)  Medication Expectations   Why am I taking this medication? You are taking Ozempic, along with diet and exercise, to lower blood sugar because you have type 2 diabetes. Diabetes is not curable but with proper medication and treatment, we can keep your blood sugar within your personalized target range.    What should I expect while on this medication? You should expect to see your blood sugar, A1c and possibly weight decrease over time.   How does the medication work? Ozempic is a non-insulin injection that works with your body to release insulin in response to your blood sugar rising.  This medication also slows down food from leaving your stomach, making you feel rudolph for longer.   How long will I be on this medication for? The amount of time you will be on this medication will be determined by your doctor based on blood sugar and A1c control. You will most likely be on this medication or another diabetes medication throughout your lifetime. Do not abruptly stop this medication without talking to your doctor first.    How do I take this medication? Take as directed on your prescription label. Ozempic is injected under the skin (subcutaneously) of your stomach, thigh or upper  arm.  Use this medication once weekly, on the same day each week, and it can be given with or without food.    What are some possible side effects? You may notice you don't feel as hungry, especially when you first start using Ozempic.  The most common side effects are nausea, stomach cramping, and constipation. Stop using Ozempic and call your doctor immediately if you have severe pain in your stomach area that will not go away.    What happens if I miss a dose? If you miss a dose, take it as soon as you remember as long as it is within 5 days after your missed dose.  If more than 5 days have passed, skip the missed dose and resume Ozempic on the regularly scheduled day.     Medication Safety   What are things I should warn my doctor immediately about? Do not use Ozempic if you or a family member have ever had medullary thyroid cancer (MTC) or Multiple Endocrine Neoplasia syndrome type 2 (MEN 2).  Tell your doctor if you have or have had problems with your kidneys or pancreas, or if you are pregnant, planning to become pregnant. Stop using Ozempic and get medical help right away if you have severe pain that will not go away, or if you notice any signs/symptoms of an allergic reaction (rash, hives, difficulty breathing, etc.).    What are things that I should be cautious of? Be cautious of any side effects from this medication. Talk to your doctor if any new ones develop or aren't getting better.    What are some medications that can interact with this one? Taking Ozempic with other medications that also lower your blood sugar such as insulin and glipizide/glimepiride/glyburide may increase the risk of low blood sugar. Your doctor may reduce the dose of these medications when you start Ozempic.  Always tell your doctor or pharmacist immediately if you start taking any new medications, including over-the-counter medications, vitamins, and herbal supplements.       Medication Storage/Handling   How should I handle  this medication? Keep this medication out of reach of pets/children and keep the pen capped    How does this medication need to be stored? Store in the refrigerator prior to first use, but do not freeze.  After first use, you may continue to store in the refrigerator or at room temperature for 56 days.  Protect from excessive heat and sunlight.   How should I dispose of this medication? Used Ozempic pens should be thrown away after 56 days, even if medication remains. If your doctor decides to stop this medication, take to your local police station for proper disposal. Some pharmacies also have take-back bins for medication drop-off.      Resources/Support   How can I remind myself to take this medication? You can download reminder apps to help you manage your refills. You may also set an alarm on your phone to remind you.    Is financial support available?  [employer] can provide co-pay cards if you have commercial insurance or patient assistance if you have Medicare or no insurance.    Which vaccines are recommended for me? Talk to your doctor about these vaccines: Flu, Coronavirus (COVID-19), Pneumococcal (pneumonia), Tdap, Hepatitis B, Zoster (shingles)          Cinthia Pablo, PharmD  Clinical Specialty Pharmacist, Endocrinology  12/22/2023  10:51 EST

## 2023-12-22 NOTE — PATIENT INSTRUCTIONS
Please stop smoking  Start Mounjaro 2.5 mg subcu weekly for 4 weeks and if you are able to tolerate then increase the dose to 5 mg subcu weekly  Continue your Basaglar 55 units subcu daily  Continue Jardiance 10 mg once a day  Continue to work on your diet and activity  Always keep glucose source in case of low blood sugar  Labs today.

## 2023-12-22 NOTE — PROGRESS NOTES
Endocrine Progress Note Outpatient     Patient Care Team:  Abril Hopper APRN as PCP - Libby Friend (Nurse Practitioner)    Chief Complaint: Follow-up type 2 diabetes    HPI: 53-year-old female with history of type 2 diabetes, hypertension, hyperlipidemia, morbid obesity is here for follow-up.  She was last seen in August 2021.     For type 2 diabetes she is currently taking Jardiance 10 mg once a day along with Basaglar 55 units once a day.  She tells me she checks her blood sugars 2-3 times per day and they are running high at home.  She unfortunately did not bring blood sugar records for review.  She is working on her diet the best she can.    Hypertension: Well-controlled    Hyperlipidemia: She was on atorvastatin but has not been taking it.    Past Medical History:   Diagnosis Date    Arthritis     Back pain     Cervical disc disorder     Diabetes     DM (diabetes mellitus), type 2     History of MRSA infection     Hyperlipidemia     Hypertension     Hypertension     Kidney stone     Low back pain     Torn meniscus     left knee pain- torn meniscus (ABSTRACTED FROM [employer])       Social History     Socioeconomic History    Marital status:     Number of children: 3    Years of education: 12   Tobacco Use    Smoking status: Every Day     Packs/day: 0.50     Years: 5.00     Additional pack years: 0.00     Total pack years: 2.50     Types: Cigarettes    Smokeless tobacco: Never   Vaping Use    Vaping Use: Never used   Substance and Sexual Activity    Alcohol use: Not Currently     Comment: Rarely    Drug use: No    Sexual activity: Not Currently     Partners: Male       Family History   Problem Relation Age of Onset    Diabetes Mother     No Known Problems Father     Diabetes Other        No Known Allergies    ROS:   Constitutional:  Denies fatigue, tiredness.    Eyes:  Denies change in visual acuity   HENT:  Denies nasal congestion or sore throat   Respiratory: denies cough, shortness of  breath.   Cardiovascular:  denies chest pain, edema   GI:  Denies abdominal pain, nausea, vomiting.   Musculoskeletal:  Denies back pain or joint pain   Integument:  Denies dry skin and rash   Neurologic:  Denies headache, focal weakness or sensory changes   Endocrine:  Denies polyuria or polydipsia   Psychiatric:  Denies depression or anxiety      Vitals:    12/22/23 0846   BP: 125/80     Body mass index is 37.1 kg/m².     Physical Exam:  GEN: NAD, conversant, obese  EYES: EOMI,  NECK: no thyromegaly,   CV: RRR,   LUNG: CTA  PSYCH: AOX3, appropriate mood, affect normal      Results Review:     I reviewed the patient's new clinical results.      Lab Results   Component Value Date    GLUCOSE 161 (H) 09/04/2023    BUN 8 09/04/2023    CREATININE 0.67 09/04/2023    EGFRIFAFRI 110 08/12/2021    BCR 11.9 09/04/2023    K 3.9 09/04/2023    CO2 27.0 09/04/2023    CALCIUM 8.8 09/04/2023    ALBUMIN 3.8 09/04/2023    AST 27 09/04/2023    ALT 28 09/04/2023    CHOL 230 (H) 08/12/2021    TRIG 134 08/12/2021     (H) 08/12/2021    HDL 51 08/12/2021         Medication Review: Reviewed.       Current Outpatient Medications:     albuterol sulfate  (90 Base) MCG/ACT inhaler, Inhale 2 puffs Every 4 (Four) Hours As Needed for Wheezing., Disp: 1 g, Rfl: 0    atorvastatin (LIPITOR) 10 MG tablet, TAKE ONE (1) TABLET BY MOUTH DAILY, Disp: 30 tablet, Rfl: 1    brompheniramine-pseudoephedrine-DM 30-2-10 MG/5ML syrup, Take 5 mL by mouth 4 (Four) Times a Day As Needed for Congestion or Cough., Disp: 210 mL, Rfl: 0    cyclobenzaprine (FLEXERIL) 10 MG tablet, , Disp: , Rfl:     cyclobenzaprine (FLEXERIL) 5 MG tablet, , Disp: , Rfl:     Diclofenac Sodium (VOLTAREN) 1 % gel gel, APPLY TWO (2) grams TOPICALLY TO THE AFFECTED AREA FOUR TIMES DAILY, Disp: , Rfl:     dicyclomine (BENTYL) 10 MG capsule, , Disp: , Rfl:     ergocalciferol (ERGOCALCIFEROL) 1.25 MG (04614 UT) capsule, Take 1 capsule by mouth 1 (One) Time Per Week., Disp: , Rfl:      escitalopram (LEXAPRO) 20 MG tablet, TAKE ONE (1) TABLET BY MOUTH EVERY NIGHT AT BEDTIME, Disp: , Rfl:     fluconazole (DIFLUCAN) 150 MG tablet, , Disp: , Rfl:     fluconazole (DIFLUCAN) 200 MG tablet, , Disp: , Rfl:     gabapentin (NEURONTIN) 600 MG tablet, TAKE ONE (1) TABLET BY MOUTH UP TO THREE TIMES DAILY, Disp: , Rfl:     glucose blood (FREESTYLE LITE) test strip, FREESTYLE LITE TEST STRP, Disp: , Rfl:     HYDROcodone-acetaminophen (NORCO)  MG per tablet, Every 8 (Eight) Hours., Disp: , Rfl:     hydrOXYzine (ATARAX) 50 MG tablet, TAKE ONE (1) TABLET BY MOUTH ONE (1) hour prior TO procedure, MAY REPEAT one IN 45 MINUTES, Disp: , Rfl:     Insulin Glargine (BASAGLAR KWIKPEN) 100 UNIT/ML injection pen, Inject 55 Units under the skin into the appropriate area as directed Daily., Disp: , Rfl:     Jardiance 10 MG tablet tablet, TAKE ONE (1) TABLET BY MOUTH DAILY, Disp: 30 tablet, Rfl: 1    lidocaine (XYLOCAINE) 5 % ointment, , Disp: , Rfl:     lisinopril-hydrochlorothiazide (PRINZIDE,ZESTORETIC) 20-25 MG per tablet, Daily., Disp: , Rfl:     LORazepam (ATIVAN) 1 MG tablet, , Disp: , Rfl:     montelukast (SINGULAIR) 10 MG tablet, Take 1 tablet by mouth Daily., Disp: , Rfl:     naloxone (NARCAN) 4 MG/0.1ML nasal spray, Call 911. Don't prime. [place] in 1 nostril for overdose. Repeat in 2-3 minutes in other nostril if no or minimal breathing/responsiveness., Disp: 2 each, Rfl: 0    ondansetron ODT (ZOFRAN-ODT) 4 MG disintegrating tablet, Place 1 tablet on the tongue Every 8 (Eight) Hours As Needed for Nausea or Vomiting., Disp: 20 tablet, Rfl: 0    promethazine (PHENERGAN) 25 MG tablet, , Disp: , Rfl:       Assessment & Plan   1.  Diabetes mellitus type 2 with Hyperglycemia: Uncontrolled with very high blood sugars, at this time I will add Mounjaro 2.5 mg subcu weekly for 4 weeks and if able to tolerate then increase the dose to 5 mg subcu weekly.  She will continue Basaglar 55 units once a day along with  Jardiance 10 mg once a day.  She needs to bring her blood sugar records for review at each visit and continue to work on diet and activity.  She is advised to always keep glucose source in case of low blood sugars.    2.  Hypertension: Well-controlled, continue current medication    3.  Hyperlipidemia: Will add Crestor 10 mg p.o. daily and follow lipid panel.    4.  Obesity:  Recommend to continue to work on diet and activity.    Assessment and plan from August 5, 2021 reviewed and updated.            Gagan Watkins MD FACE.

## 2023-12-23 NOTE — PROGRESS NOTES
A1c and LDL high.  Mounjaro and Crestor was added on last visit.  Check A1c, CMP and lipid panel before next visit.

## 2024-01-18 ENCOUNTER — TELEPHONE (OUTPATIENT)
Dept: ENDOCRINOLOGY | Facility: CLINIC | Age: 54
End: 2024-01-18

## 2024-03-12 ENCOUNTER — TELEPHONE (OUTPATIENT)
Dept: ENDOCRINOLOGY | Facility: CLINIC | Age: 54
End: 2024-03-12

## 2024-03-12 NOTE — TELEPHONE ENCOUNTER
Hub staff attempted to follow warm transfer process and was unsuccessful     Caller: Poly Jordan    Relationship to patient: Self    Best call back number: [phone]    Patient is needing: PT HAD A MISSED CALL FROM NICOLE 3/8/24. CALLING BACK AGAIN.

## 2024-03-21 DIAGNOSIS — E78.2 MIXED HYPERLIPIDEMIA: ICD-10-CM

## 2024-03-21 DIAGNOSIS — I10 HYPERTENSION, BENIGN: Primary | ICD-10-CM

## 2024-03-21 DIAGNOSIS — E11.65 TYPE 2 DIABETES MELLITUS WITH HYPERGLYCEMIA, WITHOUT LONG-TERM CURRENT USE OF INSULIN: ICD-10-CM

## 2024-05-15 ENCOUNTER — LAB (OUTPATIENT)
Dept: LAB | Facility: HOSPITAL | Age: 54
End: 2024-05-15
Payer: MEDICAID

## 2024-05-15 ENCOUNTER — HOSPITAL ENCOUNTER (OUTPATIENT)
Dept: CARDIOLOGY | Facility: HOSPITAL | Age: 54
Discharge: HOME | End: 2024-05-15
Payer: MEDICAID

## 2024-05-15 ENCOUNTER — TRANSCRIBE ORDERS (OUTPATIENT)
Dept: ADMINISTRATIVE | Facility: HOSPITAL | Age: 54
End: 2024-05-15
Payer: MEDICAID

## 2024-05-15 DIAGNOSIS — N20.0 URIC ACID NEPHROLITHIASIS: Primary | ICD-10-CM

## 2024-05-15 DIAGNOSIS — N20.0 URIC ACID NEPHROLITHIASIS: ICD-10-CM

## 2024-05-15 LAB
ANION GAP SERPL CALCULATED.3IONS-SCNC: 9.6 MMOL/L (ref 5–15)
BASOPHILS # BLD AUTO: 0.04 10*3/MM3 (ref 0–0.2)
BASOPHILS NFR BLD AUTO: 0.5 % (ref 0–1.5)
BUN SERPL-MCNC: 10 MG/DL (ref 6–20)
BUN/CREAT SERPL: 13.3 (ref 7–25)
CALCIUM SPEC-SCNC: 9.3 MG/DL (ref 8.6–10.5)
CHLORIDE SERPL-SCNC: 107 MMOL/L (ref 98–107)
CO2 SERPL-SCNC: 24.4 MMOL/L (ref 22–29)
CREAT SERPL-MCNC: 0.75 MG/DL (ref 0.57–1)
DEPRECATED RDW RBC AUTO: 42.1 FL (ref 37–54)
EGFRCR SERPLBLD CKD-EPI 2021: 96.5 ML/MIN/1.73
EOSINOPHIL # BLD AUTO: 0.15 10*3/MM3 (ref 0–0.4)
EOSINOPHIL NFR BLD AUTO: 1.8 % (ref 0.3–6.2)
ERYTHROCYTE [DISTWIDTH] IN BLOOD BY AUTOMATED COUNT: 12.9 % (ref 12.3–15.4)
GLUCOSE SERPL-MCNC: 117 MG/DL (ref 65–99)
HCT VFR BLD AUTO: 43 % (ref 34–46.6)
HGB BLD-MCNC: 14.4 G/DL (ref 12–15.9)
IMM GRANULOCYTES # BLD AUTO: 0.03 10*3/MM3 (ref 0–0.05)
IMM GRANULOCYTES NFR BLD AUTO: 0.4 % (ref 0–0.5)
LYMPHOCYTES # BLD AUTO: 2.65 10*3/MM3 (ref 0.7–3.1)
LYMPHOCYTES NFR BLD AUTO: 31.9 % (ref 19.6–45.3)
MCH RBC QN AUTO: 30.1 PG (ref 26.6–33)
MCHC RBC AUTO-ENTMCNC: 33.5 G/DL (ref 31.5–35.7)
MCV RBC AUTO: 89.8 FL (ref 79–97)
MONOCYTES # BLD AUTO: 0.4 10*3/MM3 (ref 0.1–0.9)
MONOCYTES NFR BLD AUTO: 4.8 % (ref 5–12)
NEUTROPHILS NFR BLD AUTO: 5.05 10*3/MM3 (ref 1.7–7)
NEUTROPHILS NFR BLD AUTO: 60.6 % (ref 42.7–76)
NRBC BLD AUTO-RTO: 0 /100 WBC (ref 0–0.2)
PLATELET # BLD AUTO: 258 10*3/MM3 (ref 140–450)
PMV BLD AUTO: 10.8 FL (ref 6–12)
POTASSIUM SERPL-SCNC: 3.8 MMOL/L (ref 3.5–5.2)
QT INTERVAL: 393 MS
QTC INTERVAL: 416 MS
RBC # BLD AUTO: 4.79 10*6/MM3 (ref 3.77–5.28)
SODIUM SERPL-SCNC: 141 MMOL/L (ref 136–145)
WBC NRBC COR # BLD AUTO: 8.32 10*3/MM3 (ref 3.4–10.8)

## 2024-05-15 PROCEDURE — 80048 BASIC METABOLIC PNL TOTAL CA: CPT

## 2024-05-15 PROCEDURE — 36415 COLL VENOUS BLD VENIPUNCTURE: CPT

## 2024-05-15 PROCEDURE — 93005 ELECTROCARDIOGRAM TRACING: CPT | Performed by: UROLOGY

## 2024-05-15 PROCEDURE — 85025 COMPLETE CBC W/AUTO DIFF WBC: CPT

## 2024-05-25 LAB
QT INTERVAL: 393 MS
QTC INTERVAL: 416 MS

## 2024-06-27 RX ORDER — SEMAGLUTIDE 0.68 MG/ML
0.5 INJECTION, SOLUTION SUBCUTANEOUS
Qty: 3 ML | Refills: 2 | Status: SHIPPED | OUTPATIENT
Start: 2024-06-27

## 2024-08-16 ENCOUNTER — OFFICE VISIT (OUTPATIENT)
Dept: ENDOCRINOLOGY | Facility: CLINIC | Age: 54
End: 2024-08-16
Payer: MEDICAID

## 2024-08-16 VITALS
BODY MASS INDEX: 34.71 KG/M2 | WEIGHT: 229 LBS | DIASTOLIC BLOOD PRESSURE: 75 MMHG | HEIGHT: 68 IN | SYSTOLIC BLOOD PRESSURE: 135 MMHG

## 2024-08-16 DIAGNOSIS — I10 HYPERTENSION, BENIGN: ICD-10-CM

## 2024-08-16 DIAGNOSIS — E78.2 MIXED HYPERLIPIDEMIA: ICD-10-CM

## 2024-08-16 DIAGNOSIS — E11.65 TYPE 2 DIABETES MELLITUS WITH HYPERGLYCEMIA, WITHOUT LONG-TERM CURRENT USE OF INSULIN: Primary | ICD-10-CM

## 2024-08-16 LAB — GLUCOSE BLDC GLUCOMTR-MCNC: 113 MG/DL (ref 70–105)

## 2024-08-16 PROCEDURE — 82948 REAGENT STRIP/BLOOD GLUCOSE: CPT | Performed by: INTERNAL MEDICINE

## 2024-08-16 RX ORDER — OXYCODONE HYDROCHLORIDE AND ACETAMINOPHEN 5; 325 MG/1; MG/1
TABLET ORAL
COMMUNITY
Start: 2024-05-21

## 2024-08-16 NOTE — PATIENT INSTRUCTIONS
Please stop smoking  Continue current medication  Labs fasting in next few days  Continue to work on diet and activity

## 2024-08-16 NOTE — PROGRESS NOTES
Endocrine Progress Note Outpatient     Patient Care Team:  Abril Hopper APRN as PCP - Libby Friend (Nurse Practitioner)    Chief Complaint: Follow-up type 2 diabetes    HPI: 53-year-old female with history of type 2 diabetes, hypertension, hyperlipidemia, morbid obesity is here for follow-up.      For type 2 diabetes she is currently taking Ozempic 0.5 mg subcu weekly.  She is off insulin and Jardiance.  She tells me that most of the blood sugars are less than 140.  She is tolerating her medications well.  She has lost almost 30 pounds of weight since starting Ozempic.     Hypertension: Well-controlled    Hyperlipidemia: She was on atorvastatin but has not been taking it.    Past Medical History:   Diagnosis Date    Arthritis     Back pain     Cervical disc disorder     Diabetes     DM (diabetes mellitus), type 2     History of MRSA infection     Hyperlipidemia     Hypertension     Hypertension     Kidney stone     Low back pain     Torn meniscus     left knee pain- torn meniscus (ABSTRACTED FROM [employer])       Social History     Socioeconomic History    Marital status:     Number of children: 3    Years of education: 12   Tobacco Use    Smoking status: Every Day     Current packs/day: 0.50     Average packs/day: 0.5 packs/day for 5.0 years (2.5 ttl pk-yrs)     Types: Cigarettes    Smokeless tobacco: Never   Vaping Use    Vaping status: Never Used   Substance and Sexual Activity    Alcohol use: Not Currently     Comment: Rarely    Drug use: No    Sexual activity: Not Currently     Partners: Male       Family History   Problem Relation Age of Onset    Diabetes Mother     No Known Problems Father     Diabetes Other        No Known Allergies    ROS:   Constitutional:  Denies fatigue, tiredness.    Eyes:  Denies change in visual acuity   HENT:  Denies nasal congestion or sore throat   Respiratory: denies cough, shortness of breath.   Cardiovascular:  denies chest pain, edema   GI:  Denies  abdominal pain, nausea, vomiting.   Musculoskeletal:  Denies back pain or joint pain   Integument:  Denies dry skin and rash   Neurologic:  Denies headache, focal weakness or sensory changes   Endocrine:  Denies polyuria or polydipsia   Psychiatric:  Denies depression or anxiety      Vitals:    08/16/24 1059   BP: 135/75     Body mass index is 34.82 kg/m².     Physical Exam:  GEN: NAD, conversant, obese  EYES: EOMI,  NECK: no thyromegaly,   CV: RRR,   LUNG: CTA  PSYCH: AOX3, appropriate mood, affect normal      Results Review:     I reviewed the patient's new clinical results.      Lab Results   Component Value Date    GLUCOSE 117 (H) 05/15/2024    BUN 10 05/15/2024    CREATININE 0.75 05/15/2024    EGFRIFAFRI 110 08/12/2021    BCR 13.3 05/15/2024    K 3.8 05/15/2024    CO2 24.4 05/15/2024    CALCIUM 9.3 05/15/2024    ALBUMIN 4.5 12/22/2023    AST 17 12/22/2023    ALT 22 12/22/2023    CHOL 263 (H) 12/22/2023    TRIG 320 (H) 12/22/2023     (H) 12/22/2023    HDL 41 12/22/2023         Medication Review: Reviewed.       Current Outpatient Medications:     albuterol sulfate  (90 Base) MCG/ACT inhaler, Inhale 2 puffs Every 4 (Four) Hours As Needed for Wheezing., Disp: 1 g, Rfl: 0    cyclobenzaprine (FLEXERIL) 5 MG tablet, , Disp: , Rfl:     Diclofenac Sodium (VOLTAREN) 1 % gel gel, APPLY TWO (2) grams TOPICALLY TO THE AFFECTED AREA FOUR TIMES DAILY, Disp: , Rfl:     dicyclomine (BENTYL) 10 MG capsule, , Disp: , Rfl:     ergocalciferol (ERGOCALCIFEROL) 1.25 MG (29104 UT) capsule, Take 1 capsule by mouth 1 (One) Time Per Week., Disp: , Rfl:     escitalopram (LEXAPRO) 20 MG tablet, TAKE ONE (1) TABLET BY MOUTH EVERY NIGHT AT BEDTIME, Disp: , Rfl:     fluconazole (DIFLUCAN) 200 MG tablet, , Disp: , Rfl:     gabapentin (NEURONTIN) 600 MG tablet, TAKE ONE (1) TABLET BY MOUTH UP TO THREE TIMES DAILY, Disp: , Rfl:     glucose blood (FREESTYLE LITE) test strip, FREESTYLE LITE TEST STRP, Disp: , Rfl:      HYDROcodone-acetaminophen (NORCO)  MG per tablet, Every 8 (Eight) Hours., Disp: , Rfl:     hydrOXYzine (ATARAX) 50 MG tablet, TAKE ONE (1) TABLET BY MOUTH ONE (1) hour prior TO procedure, MAY REPEAT one IN 45 MINUTES, Disp: , Rfl:     lidocaine (XYLOCAINE) 5 % ointment, , Disp: , Rfl:     lisinopril-hydrochlorothiazide (PRINZIDE,ZESTORETIC) 20-25 MG per tablet, Daily., Disp: , Rfl:     LORazepam (ATIVAN) 1 MG tablet, , Disp: , Rfl:     montelukast (SINGULAIR) 10 MG tablet, Take 1 tablet by mouth Daily., Disp: , Rfl:     naloxone (NARCAN) 4 MG/0.1ML nasal spray, Call 911. Don't prime. [place] in 1 nostril for overdose. Repeat in 2-3 minutes in other nostril if no or minimal breathing/responsiveness., Disp: 2 each, Rfl: 0    ondansetron ODT (ZOFRAN-ODT) 4 MG disintegrating tablet, Place 1 tablet on the tongue Every 8 (Eight) Hours As Needed for Nausea or Vomiting., Disp: 20 tablet, Rfl: 0    oxyCODONE-acetaminophen (PERCOCET) 5-325 MG per tablet, , Disp: , Rfl:     Ozempic, 0.25 or 0.5 MG/DOSE, 2 MG/3ML solution pen-injector, INJECT 0.5 MG UNDER THE SKIN INTO THE APPROPRIATE AREA AS DIRECTED 1 (ONE) TIME PER WEEK., Disp: 3 mL, Rfl: 2    promethazine (PHENERGAN) 25 MG tablet, , Disp: , Rfl:       Assessment & Plan   1.  Diabetes mellitus type 2 with Hyperglycemia: Overall doing well with blood sugars running less than 140.  No recent labs, will check A1c and continue Ozempic at 0.5 mg subcu weekly.    2.  Hypertension: Well-controlled, continue current medication    3.  Hyperlipidemia: She has not started any lipid-lowering medications, will check lipid panel.    4.  Obesity:  Recommend to continue to work on diet and activity.    Assessment and plan from December 22, 2023 reviewed and updated.            Gagan Watkins MD FACE.

## 2024-08-19 ENCOUNTER — LAB (OUTPATIENT)
Dept: LAB | Facility: HOSPITAL | Age: 54
End: 2024-08-19
Payer: MEDICAID

## 2024-08-19 DIAGNOSIS — E78.2 MIXED HYPERLIPIDEMIA: ICD-10-CM

## 2024-08-19 DIAGNOSIS — I10 HYPERTENSION, BENIGN: ICD-10-CM

## 2024-08-19 DIAGNOSIS — E11.65 TYPE 2 DIABETES MELLITUS WITH HYPERGLYCEMIA, WITHOUT LONG-TERM CURRENT USE OF INSULIN: ICD-10-CM

## 2024-08-19 LAB
ALBUMIN SERPL-MCNC: 4.3 G/DL (ref 3.5–5.2)
ALBUMIN UR-MCNC: 4 MG/DL
ALBUMIN/GLOB SERPL: 1.5 G/DL
ALP SERPL-CCNC: 104 U/L (ref 39–117)
ALT SERPL W P-5'-P-CCNC: 27 U/L (ref 1–33)
ANION GAP SERPL CALCULATED.3IONS-SCNC: 8 MMOL/L (ref 5–15)
AST SERPL-CCNC: 23 U/L (ref 1–32)
BILIRUB SERPL-MCNC: 0.5 MG/DL (ref 0–1.2)
BUN SERPL-MCNC: 10 MG/DL (ref 6–20)
BUN/CREAT SERPL: 13.3 (ref 7–25)
CALCIUM SPEC-SCNC: 9.5 MG/DL (ref 8.6–10.5)
CHLORIDE SERPL-SCNC: 105 MMOL/L (ref 98–107)
CHOLEST SERPL-MCNC: 195 MG/DL (ref 0–200)
CO2 SERPL-SCNC: 27 MMOL/L (ref 22–29)
CREAT SERPL-MCNC: 0.75 MG/DL (ref 0.57–1)
CREAT UR-MCNC: 220.3 MG/DL
EGFRCR SERPLBLD CKD-EPI 2021: 96.5 ML/MIN/1.73
GLOBULIN UR ELPH-MCNC: 2.9 GM/DL
GLUCOSE SERPL-MCNC: 128 MG/DL (ref 65–99)
HBA1C MFR BLD: 7.14 % (ref 4.8–5.6)
HDLC SERPL-MCNC: 44 MG/DL (ref 40–60)
LDLC SERPL CALC-MCNC: 132 MG/DL (ref 0–100)
LDLC/HDLC SERPL: 2.96 {RATIO}
MICROALBUMIN/CREAT UR: 18.2 MG/G (ref 0–29)
POTASSIUM SERPL-SCNC: 3.9 MMOL/L (ref 3.5–5.2)
PROT SERPL-MCNC: 7.2 G/DL (ref 6–8.5)
SODIUM SERPL-SCNC: 140 MMOL/L (ref 136–145)
TRIGL SERPL-MCNC: 103 MG/DL (ref 0–150)
VLDLC SERPL-MCNC: 19 MG/DL (ref 5–40)

## 2024-08-19 PROCEDURE — 36415 COLL VENOUS BLD VENIPUNCTURE: CPT

## 2024-08-19 PROCEDURE — 82570 ASSAY OF URINE CREATININE: CPT

## 2024-08-19 PROCEDURE — 80053 COMPREHEN METABOLIC PANEL: CPT

## 2024-08-19 PROCEDURE — 82043 UR ALBUMIN QUANTITATIVE: CPT

## 2024-08-19 PROCEDURE — 80061 LIPID PANEL: CPT

## 2024-08-19 PROCEDURE — 83036 HEMOGLOBIN GLYCOSYLATED A1C: CPT

## 2024-08-20 ENCOUNTER — TELEPHONE (OUTPATIENT)
Dept: ENDOCRINOLOGY | Facility: CLINIC | Age: 54
End: 2024-08-20
Payer: MEDICAID

## 2024-08-20 DIAGNOSIS — E11.65 TYPE 2 DIABETES MELLITUS WITH HYPERGLYCEMIA, WITHOUT LONG-TERM CURRENT USE OF INSULIN: Primary | ICD-10-CM

## 2024-08-20 RX ORDER — ROSUVASTATIN CALCIUM 10 MG/1
10 TABLET, COATED ORAL NIGHTLY
Qty: 30 TABLET | Refills: 11 | Status: SHIPPED | OUTPATIENT
Start: 2024-08-20 | End: 2025-08-20

## 2024-08-20 NOTE — TELEPHONE ENCOUNTER
Notified pt of results, pt stated understanding    ----- Message from Gagan Watkins sent at 8/19/2024  3:41 PM EDT -----  A1c much better at 7.14.  Please notify patient.  Continue current management.

## 2024-08-23 ENCOUNTER — TRANSCRIBE ORDERS (OUTPATIENT)
Dept: ADMINISTRATIVE | Facility: HOSPITAL | Age: 54
End: 2024-08-23
Payer: MEDICAID

## 2024-08-23 DIAGNOSIS — M25.571 RIGHT ANKLE PAIN, UNSPECIFIED CHRONICITY: Primary | ICD-10-CM

## 2024-08-30 ENCOUNTER — HOSPITAL ENCOUNTER (OUTPATIENT)
Dept: CT IMAGING | Facility: HOSPITAL | Age: 54
Discharge: HOME | End: 2024-08-30
Payer: MEDICAID

## 2024-08-30 DIAGNOSIS — M25.571 RIGHT ANKLE PAIN, UNSPECIFIED CHRONICITY: ICD-10-CM

## 2024-08-30 PROCEDURE — 73700 CT LOWER EXTREMITY W/O DYE: CPT

## 2024-09-12 RX ORDER — SEMAGLUTIDE 0.68 MG/ML
0.5 INJECTION, SOLUTION SUBCUTANEOUS WEEKLY
Qty: 3 ML | Refills: 2 | Status: SHIPPED | OUTPATIENT
Start: 2024-09-12 | End: 2024-09-16

## 2024-09-16 RX ORDER — SEMAGLUTIDE 0.68 MG/ML
0.5 INJECTION, SOLUTION SUBCUTANEOUS WEEKLY
Qty: 3 ML | Refills: 2 | Status: SHIPPED | OUTPATIENT
Start: 2024-09-16

## 2024-10-24 ENCOUNTER — HOSPITAL ENCOUNTER (EMERGENCY)
Facility: HOSPITAL | Age: 54
Discharge: HOME | End: 2024-10-25
Attending: EMERGENCY MEDICINE
Payer: MEDICAID

## 2024-10-24 ENCOUNTER — APPOINTMENT (OUTPATIENT)
Dept: GENERAL RADIOLOGY | Facility: HOSPITAL | Age: 54
End: 2024-10-24
Payer: MEDICAID

## 2024-10-24 DIAGNOSIS — M77.8 TENDONITIS OF ELBOW, LEFT: Primary | ICD-10-CM

## 2024-10-24 LAB
ALBUMIN SERPL-MCNC: 4.2 G/DL (ref 3.5–5.2)
ALBUMIN/GLOB SERPL: 1.4 G/DL
ALP SERPL-CCNC: 109 U/L (ref 39–117)
ALT SERPL W P-5'-P-CCNC: 35 U/L (ref 1–33)
ANION GAP SERPL CALCULATED.3IONS-SCNC: 9.7 MMOL/L (ref 5–15)
AST SERPL-CCNC: 29 U/L (ref 1–32)
BASOPHILS # BLD AUTO: 0.03 10*3/MM3 (ref 0–0.2)
BASOPHILS NFR BLD AUTO: 0.3 % (ref 0–1.5)
BILIRUB SERPL-MCNC: 0.2 MG/DL (ref 0–1.2)
BUN SERPL-MCNC: 15 MG/DL (ref 6–20)
BUN/CREAT SERPL: 20.5 (ref 7–25)
CALCIUM SPEC-SCNC: 9.7 MG/DL (ref 8.6–10.5)
CHLORIDE SERPL-SCNC: 103 MMOL/L (ref 98–107)
CO2 SERPL-SCNC: 27.3 MMOL/L (ref 22–29)
CREAT SERPL-MCNC: 0.73 MG/DL (ref 0.57–1)
DEPRECATED RDW RBC AUTO: 44.4 FL (ref 37–54)
EGFRCR SERPLBLD CKD-EPI 2021: 99.7 ML/MIN/1.73
EOSINOPHIL # BLD AUTO: 0.22 10*3/MM3 (ref 0–0.4)
EOSINOPHIL NFR BLD AUTO: 1.8 % (ref 0.3–6.2)
ERYTHROCYTE [DISTWIDTH] IN BLOOD BY AUTOMATED COUNT: 13.1 % (ref 12.3–15.4)
ERYTHROCYTE [SEDIMENTATION RATE] IN BLOOD: 24 MM/HR (ref 0–30)
GLOBULIN UR ELPH-MCNC: 3.1 GM/DL
GLUCOSE SERPL-MCNC: 125 MG/DL (ref 65–99)
HCT VFR BLD AUTO: 44.8 % (ref 34–46.6)
HGB BLD-MCNC: 14.8 G/DL (ref 12–15.9)
IMM GRANULOCYTES # BLD AUTO: 0.03 10*3/MM3 (ref 0–0.05)
IMM GRANULOCYTES NFR BLD AUTO: 0.3 % (ref 0–0.5)
LYMPHOCYTES # BLD AUTO: 4.13 10*3/MM3 (ref 0.7–3.1)
LYMPHOCYTES NFR BLD AUTO: 34.7 % (ref 19.6–45.3)
MCH RBC QN AUTO: 30.3 PG (ref 26.6–33)
MCHC RBC AUTO-ENTMCNC: 33 G/DL (ref 31.5–35.7)
MCV RBC AUTO: 91.6 FL (ref 79–97)
MONOCYTES # BLD AUTO: 0.64 10*3/MM3 (ref 0.1–0.9)
MONOCYTES NFR BLD AUTO: 5.4 % (ref 5–12)
NEUTROPHILS NFR BLD AUTO: 57.5 % (ref 42.7–76)
NEUTROPHILS NFR BLD AUTO: 6.85 10*3/MM3 (ref 1.7–7)
PLATELET # BLD AUTO: 243 10*3/MM3 (ref 140–450)
PMV BLD AUTO: 9.9 FL (ref 6–12)
POTASSIUM SERPL-SCNC: 4.1 MMOL/L (ref 3.5–5.2)
PROT SERPL-MCNC: 7.3 G/DL (ref 6–8.5)
RBC # BLD AUTO: 4.89 10*6/MM3 (ref 3.77–5.28)
SODIUM SERPL-SCNC: 140 MMOL/L (ref 136–145)
WBC NRBC COR # BLD AUTO: 11.9 10*3/MM3 (ref 3.4–10.8)

## 2024-10-24 PROCEDURE — 25010000002 KETOROLAC TROMETHAMINE PER 15 MG: Performed by: EMERGENCY MEDICINE

## 2024-10-24 PROCEDURE — 80053 COMPREHEN METABOLIC PANEL: CPT | Performed by: EMERGENCY MEDICINE

## 2024-10-24 PROCEDURE — 85652 RBC SED RATE AUTOMATED: CPT | Performed by: EMERGENCY MEDICINE

## 2024-10-24 PROCEDURE — 73080 X-RAY EXAM OF ELBOW: CPT

## 2024-10-24 PROCEDURE — 86140 C-REACTIVE PROTEIN: CPT | Performed by: EMERGENCY MEDICINE

## 2024-10-24 PROCEDURE — 96374 THER/PROPH/DIAG INJ IV PUSH: CPT

## 2024-10-24 PROCEDURE — 85025 COMPLETE CBC W/AUTO DIFF WBC: CPT | Performed by: EMERGENCY MEDICINE

## 2024-10-24 PROCEDURE — 99283 EMERGENCY DEPT VISIT LOW MDM: CPT

## 2024-10-24 RX ORDER — KETOROLAC TROMETHAMINE 30 MG/ML
15 INJECTION, SOLUTION INTRAMUSCULAR; INTRAVENOUS ONCE
Status: COMPLETED | OUTPATIENT
Start: 2024-10-24 | End: 2024-10-24

## 2024-10-24 RX ORDER — HYDROCODONE BITARTRATE AND ACETAMINOPHEN 5; 325 MG/1; MG/1
1 TABLET ORAL ONCE AS NEEDED
Status: DISCONTINUED | OUTPATIENT
Start: 2024-10-24 | End: 2024-10-25 | Stop reason: HOSPADM

## 2024-10-24 RX ADMIN — KETOROLAC TROMETHAMINE 15 MG: 30 INJECTION, SOLUTION INTRAMUSCULAR at 23:23

## 2024-10-24 RX ADMIN — HYDROCODONE BITARTRATE AND ACETAMINOPHEN 1 TABLET: 5; 325 TABLET ORAL at 21:56

## 2024-10-25 VITALS
BODY MASS INDEX: 36.68 KG/M2 | TEMPERATURE: 98.7 F | HEART RATE: 81 BPM | RESPIRATION RATE: 20 BRPM | WEIGHT: 242 LBS | DIASTOLIC BLOOD PRESSURE: 88 MMHG | HEIGHT: 68 IN | OXYGEN SATURATION: 98 % | SYSTOLIC BLOOD PRESSURE: 168 MMHG

## 2024-10-25 LAB — CRP SERPL-MCNC: <0.3 MG/DL (ref 0–0.5)

## 2024-10-25 PROCEDURE — G0463 HOSPITAL OUTPT CLINIC VISIT: HCPCS | Performed by: EMERGENCY MEDICINE

## 2024-10-25 PROCEDURE — 99284 EMERGENCY DEPT VISIT MOD MDM: CPT | Performed by: EMERGENCY MEDICINE

## 2024-10-25 RX ORDER — IBUPROFEN 600 MG/1
600 TABLET, FILM COATED ORAL EVERY 8 HOURS PRN
Qty: 15 TABLET | Refills: 0 | Status: SHIPPED | OUTPATIENT
Start: 2024-10-25

## 2024-10-25 NOTE — DISCHARGE INSTRUCTIONS
Take medication as prescribed. Follow-up with your Doctor. Return for fever, worsening symptoms or for any questions or concerns.

## 2024-10-25 NOTE — FSED PROVIDER NOTE
Subjective   History of Present Illness  53 yof complains of elbow pain. The patient states she woke-up with her elbow hurting her this morning. As the day went on she started to have more and more pain. Pt denies injury. Pt denies fever, chills, redness or nausea. Pt notes it is difficult to straighten her arm.         Review of Systems   Constitutional: Negative.    Musculoskeletal:         Elbow pain   Skin: Negative.  Negative for color change.   Neurological:  Negative for weakness and numbness.   All other systems reviewed and are negative.      Past Medical History:   Diagnosis Date    Arthritis     Back pain     Cervical disc disorder     Diabetes     DM (diabetes mellitus), type 2     History of MRSA infection     Hyperlipidemia     Hypertension     Hypertension     Kidney stone     Low back pain     Torn meniscus     left knee pain- torn meniscus (ABSTRACTED FROM [employer])       No Known Allergies    Past Surgical History:   Procedure Laterality Date    CYSTOSCOPY W/ LITHOLAPAXY / [facility]      FOOT SURGERY Right 2018    ORIF TIBIA/FIBULA FRACTURES Right     right tib/fib ORIF (ABSTRACTED FROM [employer])    OTHER SURGICAL HISTORY Right 01/12/2018    right fusion tibiotalocalcaneal (ABSTRACTED FROM [employer])    TUBAL ABDOMINAL LIGATION  2001       Family History   Problem Relation Age of Onset    Diabetes Mother     No Known Problems Father     Diabetes Other        Social History     Socioeconomic History    Marital status:     Number of children: 3    Years of education: 12   Tobacco Use    Smoking status: Every Day     Current packs/day: 0.50     Average packs/day: 0.5 packs/day for 5.0 years (2.5 ttl pk-yrs)     Types: Cigarettes    Smokeless tobacco: Never   Vaping Use    Vaping status: Never Used   Substance and Sexual Activity    Alcohol use: Not Currently     Comment: Rarely    Drug use: No    Sexual activity: Not Currently     Partners: Male           Objective   Physical  Exam  Constitutional:       General: She is not in acute distress.     Appearance: Normal appearance.   HENT:      Head: Normocephalic and atraumatic.   Cardiovascular:      Rate and Rhythm: Normal rate and regular rhythm.      Pulses: Normal pulses.      Heart sounds: Normal heart sounds.   Pulmonary:      Effort: Pulmonary effort is normal.      Breath sounds: Normal breath sounds.   Musculoskeletal:      Left elbow: Swelling present. No effusion. Decreased range of motion. Tenderness present.        Arms:       Comments: TTP left elbow  Decreased ROM   Warmth present  No erythema  No effusion  NV intact  2 + pulses   Compartments soft   Skin:     General: Skin is warm and dry.      Findings: No erythema.   Neurological:      Mental Status: She is alert.         Procedures           ED Course  ED Course as of 10/25/24 0535   Fri Oct 25, 2024   0023 Discussed test results and treatment plan. Strict return precautions discussed.  [BM]      ED Course User Index  [BM] Rosemary Francis MD                                           Medical Decision Making  53 yof complains of left elbow pain. Pt most tender along the lateral epicondyle. Decreased ROM secondary to pain. No effusion present. No erythema. Normal inflammatory markers. Given work-up I have low suspicion for fracture, dislocation, significant ligamentous injury, septic arthritis, gout flare, new autoimmune arthropathy, or gonococcal arthropathy. Likely elbow tendonitis. Discussed with patient the possibility of early joint infection. Strict return precautions discussed. Pt verbalizes understanding and is agreeable with the plan.     Problems Addressed:  Tendonitis of elbow, left: complicated acute illness or injury    Amount and/or Complexity of Data Reviewed  Labs: ordered.  Radiology: ordered.    Risk  Prescription drug management.        Final diagnoses:   Tendonitis of elbow, left       ED Disposition  ED Disposition       ED Disposition   Discharge     Condition   Stable    Comment   --               Abril Hopper, APRN  [address] [facility] [facility]  [address]  [place] [address] [phone]    Schedule an appointment as soon as possible for a visit on 10/28/2024      [facility]  [address] [place] [id number] [phone]  Schedule an appointment as soon as possible for a visit   re-evaluation         Medication List        New Prescriptions      ibuprofen 600 MG tablet  Commonly known as: ADVIL,MOTRIN  Take 1 tablet by mouth Every 8 (Eight) Hours As Needed (pain).               Where to Get Your Medications        These medications were sent to [facility] [id number] - [place], [place] - [address] [facility] [address] [facility] - [phone]  - [phone] FX  [address] [facility] [place] [address]      Phone: [phone]   ibuprofen 600 MG tablet

## 2024-10-25 NOTE — ED NOTES
"When entering the room to give prescribed Toradol, pt c/o increasing discomfort in her left elbow.  Pt stated \"I just can't get comfortable\".  Provided pt with another pillow to prop her LUE on & administered prescribed Toradol.  Pt questioned when all of her labs will be back, informed her two of the labs were sent to the hospital & we are awaiting those results.  Spoke with lab who gave an aprox time the results/informed pt, pt verbalized understanding.  Instructed pt to call nurse if she needs assistance.   "

## 2025-01-09 ENCOUNTER — TELEPHONE (OUTPATIENT)
Dept: ENDOCRINOLOGY | Facility: CLINIC | Age: 55
End: 2025-01-09
Payer: MEDICAID

## 2025-01-09 DIAGNOSIS — E11.65 TYPE 2 DIABETES MELLITUS WITH HYPERGLYCEMIA, WITHOUT LONG-TERM CURRENT USE OF INSULIN: Primary | ICD-10-CM

## 2025-01-09 RX ORDER — SEMAGLUTIDE 0.68 MG/ML
0.5 INJECTION, SOLUTION SUBCUTANEOUS WEEKLY
Qty: 3 ML | Refills: 2 | Status: SHIPPED | OUTPATIENT
Start: 2025-01-09 | End: 2025-01-10

## 2025-01-09 NOTE — TELEPHONE ENCOUNTER
Caller: Poly Jordan    Relationship: Self    Best call back number: [phone]    Requested Prescriptions:   Requested Prescriptions     Pending Prescriptions Disp Refills    Semaglutide,0.25 or 0.5MG/DOS, (Ozempic, 0.25 or 0.5 MG/DOSE,) 2 MG/3ML solution pen-injector 3 mL 2     Sig: Inject 0.5 mg under the skin into the appropriate area as directed 1 (One) Time Per Week.        Pharmacy where request should be sent:    [facility]   Last office visit with prescribing clinician: 8/16/2024   Last telemedicine visit with prescribing clinician: Visit date not found   Next office visit with prescribing clinician: 3/10/2025     Additional details provided by patient: PATIENT STATES THIS NEEDS PRIOR AUTH. SHE NEEDS THIS PRESCRIPTION ASAP. PLEASE ADVISE    Does the patient have less than a 3 day supply:  [x] Yes  [] No    Would you like a call back once the refill request has been completed: [] Yes [] No    If the office needs to give you a call back, can they leave a voicemail: [] Yes [] No    Laureen Rodriguez Rep   01/09/25 12:02 EST

## 2025-01-09 NOTE — TELEPHONE ENCOUNTER
"[facility] is requesting her prescription be changed to a compound?  If ok please fax \"ok to change to compound\" to [phone].  Thank you  "

## 2025-01-10 DIAGNOSIS — E11.65 TYPE 2 DIABETES MELLITUS WITH HYPERGLYCEMIA, WITHOUT LONG-TERM CURRENT USE OF INSULIN: ICD-10-CM

## 2025-01-10 RX ORDER — SEMAGLUTIDE 0.68 MG/ML
0.5 INJECTION, SOLUTION SUBCUTANEOUS WEEKLY
Qty: 3 ML | Refills: 2 | Status: SHIPPED | OUTPATIENT
Start: 2025-01-10

## 2025-02-04 ENCOUNTER — HOSPITAL ENCOUNTER (EMERGENCY)
Facility: HOSPITAL | Age: 55
Discharge: HOME | End: 2025-02-04
Admitting: EMERGENCY MEDICINE
Payer: MEDICAID

## 2025-02-04 VITALS
TEMPERATURE: 98.1 F | DIASTOLIC BLOOD PRESSURE: 93 MMHG | WEIGHT: 246.47 LBS | SYSTOLIC BLOOD PRESSURE: 134 MMHG | RESPIRATION RATE: 16 BRPM | OXYGEN SATURATION: 99 % | HEART RATE: 86 BPM | BODY MASS INDEX: 37.36 KG/M2 | HEIGHT: 68 IN

## 2025-02-04 DIAGNOSIS — R59.0 LYMPHADENOPATHY OF RIGHT CERVICAL REGION: Primary | ICD-10-CM

## 2025-02-04 DIAGNOSIS — M54.2 NECK PAIN ON RIGHT SIDE: ICD-10-CM

## 2025-02-04 LAB
FLUAV SUBTYP SPEC NAA+PROBE: NOT DETECTED
FLUBV RNA ISLT QL NAA+PROBE: NOT DETECTED
S PYO AG THROAT QL: NEGATIVE
SARS-COV-2 RNA RESP QL NAA+PROBE: NOT DETECTED

## 2025-02-04 PROCEDURE — 99283 EMERGENCY DEPT VISIT LOW MDM: CPT

## 2025-02-04 PROCEDURE — 87651 STREP A DNA AMP PROBE: CPT | Performed by: NURSE PRACTITIONER

## 2025-02-04 PROCEDURE — 87636 SARSCOV2 & INF A&B AMP PRB: CPT | Performed by: NURSE PRACTITIONER

## 2025-02-04 RX ORDER — DICLOFENAC SODIUM 75 MG/1
75 TABLET, DELAYED RELEASE ORAL 2 TIMES DAILY PRN
Qty: 20 TABLET | Refills: 0 | Status: SHIPPED | OUTPATIENT
Start: 2025-02-04

## 2025-02-04 RX ORDER — ACETAMINOPHEN 500 MG
1000 TABLET ORAL ONCE
Status: COMPLETED | OUTPATIENT
Start: 2025-02-04 | End: 2025-02-04

## 2025-02-04 RX ORDER — IBUPROFEN 600 MG/1
600 TABLET, FILM COATED ORAL ONCE
Status: DISCONTINUED | OUTPATIENT
Start: 2025-02-04 | End: 2025-02-05 | Stop reason: HOSPADM

## 2025-02-04 RX ADMIN — ACETAMINOPHEN 1000 MG: 500 TABLET, FILM COATED ORAL at 21:55

## 2025-02-04 NOTE — Clinical Note
Have Your Skin Lesions Been Treated?: not been treated [facility] EMERGENCY DEPARTMENT  [address] [place] [address]  Phone: [phone]    Poly Jordan was seen and treated in our emergency department on 2/4/2025.  She may return to work on 02/06/2025.         Thank you for choosing [facility].    Sandrine Dooley RN       Is This A New Presentation, Or A Follow-Up?: Skin Lesions Which Family Member (Optional)?: Grand mother

## 2025-02-05 NOTE — ED NOTES
Pt reports right sided ear discomfort and pain to right side of neck. Pt denies trouble swallowing. Pt states symptoms started this morning  Pt applied to pulse ox.

## 2025-02-05 NOTE — DISCHARGE INSTRUCTIONS
Follow-up with primary care for continued discomfort or return to the ER for increased pain nausea vomiting fever chills or worsening symptom    Use Voltaren as needed for discomfort do not mix with Motrin ibuprofen Advil or Aleve    See your primary care provider for further evaluation and return if worse

## 2025-02-05 NOTE — ED PROVIDER NOTES
Subjective   History of Present Illness  Patient is a 54-year-old female with a 2-day history of right neck pain with swollen lymph nodes that she states is very sore to palpation.  She reports that she has had a sore throat but it is not sore now she has not had any fever or chills cough or congestion she has not had any ill contact      Review of Systems   HENT:  Positive for sore throat.        Past Medical History:   Diagnosis Date    Arthritis     Back pain     Cervical disc disorder     Diabetes     DM (diabetes mellitus), type 2     History of MRSA infection     Hyperlipidemia     Hypertension     Hypertension     Kidney stone     Low back pain     Torn meniscus     left knee pain- torn meniscus (ABSTRACTED FROM [employer])       No Known Allergies    Past Surgical History:   Procedure Laterality Date    CYSTOSCOPY W/ LITHOLAPAXY / [facility]      FOOT SURGERY Right 2018    ORIF TIBIA/FIBULA FRACTURES Right     right tib/fib ORIF (ABSTRACTED FROM [employer])    OTHER SURGICAL HISTORY Right 01/12/2018    right fusion tibiotalocalcaneal (ABSTRACTED FROM [employer])    TUBAL ABDOMINAL LIGATION  2001       Family History   Problem Relation Age of Onset    Diabetes Mother     No Known Problems Father     Diabetes Other        Social History     Socioeconomic History    Marital status:     Number of children: 3    Years of education: 12   Tobacco Use    Smoking status: Every Day     Current packs/day: 0.50     Average packs/day: 0.5 packs/day for 5.0 years (2.5 ttl pk-yrs)     Types: Cigarettes    Smokeless tobacco: Never   Vaping Use    Vaping status: Never Used   Substance and Sexual Activity    Alcohol use: Not Currently     Comment: Rarely    Drug use: No    Sexual activity: Not Currently     Partners: Male           Objective   Physical Exam  Vitals reviewed.   Constitutional:       Appearance: She is well-developed. She is obese. She is not ill-appearing.   HENT:      Head: Normocephalic and atraumatic.  "    Eyes:      Conjunctiva/sclera: Conjunctivae normal.      Pupils: Pupils are equal, round, and reactive to light.   Cardiovascular:      Rate and Rhythm: Normal rate and regular rhythm.      Heart sounds: Normal heart sounds.   Pulmonary:      Effort: Pulmonary effort is normal. No respiratory distress.      Breath sounds: Normal breath sounds. No wheezing.   Abdominal:      General: Bowel sounds are normal.      Palpations: Abdomen is soft.      Tenderness: There is no abdominal tenderness.   Musculoskeletal:         General: No deformity. Normal range of motion.      Cervical back: Normal range of motion and neck supple.   Lymphadenopathy:      Head:      Right side of head: Submandibular adenopathy present. No submental adenopathy.      Cervical: Cervical adenopathy present.      Right cervical: Superficial cervical adenopathy present.      Left cervical: No superficial or deep cervical adenopathy.   Skin:     General: Skin is warm and dry.      Capillary Refill: Capillary refill takes less than 2 seconds.   Neurological:      General: No focal deficit present.      Mental Status: She is alert and oriented to person, place, and time.      GCS: GCS eye subscore is 4. GCS verbal subscore is 5. GCS motor subscore is 6.      Motor: No weakness.   Psychiatric:         Mood and Affect: Mood normal.         Behavior: Behavior normal.         Procedures           ED Course        /95   Pulse 89   Temp 99 °F (37.2 °C) (Oral)   Resp 18   Ht 172.7 cm (68\")   Wt 112 kg (246 lb 7.6 oz)   SpO2 99%   BMI 37.48 kg/m²   Labs Reviewed   COVID-19 AND FLU A/B, NP SWAB IN TRANSPORT MEDIA 1 HR TAT - Normal    Narrative:     Fact sheet for providers: https://www.fda.gov/media/896371/download    Fact sheet for patients: https://www.fda.gov/media/415945/download    Test performed by PCR.   RAPID STREP A SCREEN - Normal     Medications   ibuprofen (ADVIL,MOTRIN) tablet 600 mg (600 mg Oral Not Given 2/4/25 2152) "   acetaminophen (TYLENOL) tablet 1,000 mg (1,000 mg Oral Given 2/4/25 1351)     No radiology results for the last day                                                 Medical Decision Making  Patient is a 54-year-old obese female who comes in with right cervical lymphadenopathy she has had no fever no chills she does not appear to have any poor dentation it is not over her salivary gland.  Patient had a negative flu COVID RSV patient had a negative strep patient will be discharged home with some Voltaren she was given Tylenol Motrin for pain here in the emergency room she was advised to follow-up with her primary care provider if not improving or return to the ER for she verbalized understood discharge instructions    Problems Addressed:  Lymphadenopathy of right cervical region: complicated acute illness or injury  Neck pain on right side: complicated acute illness or injury    Risk  OTC drugs.  Prescription drug management.        Final diagnoses:   Lymphadenopathy of right cervical region   Neck pain on right side       ED Disposition  ED Disposition       ED Disposition   Discharge    Condition   Stable    Comment   --               Abril Hopper, APRN  [address] [place] [facility]  [phone]    In 3 days  If symptoms worsen, As needed         Medication List        New Prescriptions      diclofenac 75 MG EC tablet  Commonly known as: VOLTAREN  Take 1 tablet by mouth 2 (Two) Times a Day As Needed (pain).               Where to Get Your Medications        These medications were sent to [facility] [address] [place] [address]      Hours: Monday to Friday 7 AM to 7 PM Phone: [phone]   diclofenac 75 MG EC tablet            Melany Turpin, APRN  02/04/25 4837

## 2025-02-19 ENCOUNTER — HOSPITAL ENCOUNTER (EMERGENCY)
Facility: HOSPITAL | Age: 55
Discharge: HOME | End: 2025-02-19
Attending: EMERGENCY MEDICINE | Admitting: EMERGENCY MEDICINE
Payer: MEDICAID

## 2025-02-19 VITALS
BODY MASS INDEX: 37.36 KG/M2 | SYSTOLIC BLOOD PRESSURE: 179 MMHG | HEIGHT: 68 IN | RESPIRATION RATE: 16 BRPM | HEART RATE: 98 BPM | DIASTOLIC BLOOD PRESSURE: 111 MMHG | WEIGHT: 246.47 LBS | OXYGEN SATURATION: 98 % | TEMPERATURE: 97.8 F

## 2025-02-19 DIAGNOSIS — S61.412A LACERATION OF LEFT HAND WITHOUT FOREIGN BODY, INITIAL ENCOUNTER: Primary | ICD-10-CM

## 2025-02-19 PROCEDURE — 99283 EMERGENCY DEPT VISIT LOW MDM: CPT

## 2025-02-19 RX ORDER — DIAPER,BRIEF,INFANT-TODD,DISP
1 EACH MISCELLANEOUS ONCE
Status: COMPLETED | OUTPATIENT
Start: 2025-02-19 | End: 2025-02-19

## 2025-02-19 RX ADMIN — BACITRACIN 0.9 G: 500 OINTMENT TOPICAL at 11:13

## 2025-02-19 NOTE — ED PROVIDER NOTES
Subjective   History of Present Illness  84-year-old female presents with laceration to left hand.  She was cutting a block auto when knife slipped and she stabbed her hand.  She to started on blood thinners today with Xarelto.  She does not complain of numbness or weakness.  Just complains of pain at the site of wound.  Review of Systems  Right-hand-dominant  Past Medical History:   Diagnosis Date    Arthritis     Back pain     Cervical disc disorder     Diabetes     DM (diabetes mellitus), type 2     History of MRSA infection     Hyperlipidemia     Hypertension     Hypertension     Kidney stone     Low back pain     Torn meniscus     left knee pain- torn meniscus (ABSTRACTED FROM [email or web address])       No Known Allergies    Past Surgical History:   Procedure Laterality Date    CYSTOSCOPY W/ LITHOLAPAXY / [facility]      FOOT SURGERY Right 2018    ORIF TIBIA/FIBULA FRACTURES Right     right tib/fib ORIF (ABSTRACTED FROM [email or web address])    OTHER SURGICAL HISTORY Right 01/12/2018    right fusion tibiotalocalcaneal (ABSTRACTED FROM [email or web address])    TUBAL ABDOMINAL LIGATION  2001       Family History   Problem Relation Age of Onset    Diabetes Mother     No Known Problems Father     Diabetes Other        Social History     Socioeconomic History    Marital status:     Number of children: 3    Years of education: 12   Tobacco Use    Smoking status: Every Day     Current packs/day: 0.50     Average packs/day: 0.5 packs/day for 5.0 years (2.5 ttl pk-yrs)     Types: Cigarettes    Smokeless tobacco: Never   Vaping Use    Vaping status: Never Used   Substance and Sexual Activity    Alcohol use: Not Currently     Comment: Rarely    Drug use: No    Sexual activity: Not Currently     Partners: Male     Prior to Admission medications    Medication Sig Start Date End Date Taking? Authorizing Provider   albuterol sulfate  (90 Base) MCG/ACT inhaler Inhale 2 puffs Every 4 (Four) Hours As Needed for Wheezing. 12/1/22   Alejandro Kelley MD    cyclobenzaprine (FLEXERIL) 5 MG tablet     Susan Borja MD   diclofenac (VOLTAREN) 75 MG EC tablet Take 1 tablet by mouth 2 (Two) Times a Day As Needed (pain). 2/4/25   Melany Turpin APRN   dicyclomine (BENTYL) 10 MG capsule     Susan Borja MD   ergocalciferol (ERGOCALCIFEROL) 1.25 MG (42805 UT) capsule Take 1 capsule by mouth 1 (One) Time Per Week. 12/22/23   Gagan Watkins MD   escitalopram (LEXAPRO) 20 MG tablet TAKE ONE (1) TABLET BY MOUTH EVERY NIGHT AT BEDTIME 7/3/23   Susan Borja MD   fluconazole (DIFLUCAN) 200 MG tablet     Susan Borja MD   gabapentin (NEURONTIN) 600 MG tablet TAKE ONE (1) TABLET BY MOUTH UP TO THREE TIMES DAILY 10/17/23   Susan Borja MD   gabapentin (NEURONTIN) 600 MG tablet Take 1 tablet 3 times a day by oral route. 12/12/24      glucose blood (FREESTYLE LITE) test strip FREESTYLE LITE TEST STRP 12/4/18   Susan Borja MD   HYDROcodone-acetaminophen (NORCO)  MG per tablet Every 8 (Eight) Hours. 11/9/17   Susan Borja MD   HYDROcodone-acetaminophen (NORCO)  MG per tablet Take 1 tablet every 6 hours by oral route as needed for 30 days. 12/12/24      hydrOXYzine (ATARAX) 50 MG tablet TAKE ONE (1) TABLET BY MOUTH ONE (1) hour prior TO procedure, MAY REPEAT one IN 45 MINUTES 10/13/23   Susan Borja MD   lidocaine (XYLOCAINE) 5 % ointment     Susan Borja MD   lisinopril-hydrochlorothiazide (PRINZIDE,ZESTORETIC) 20-25 MG per tablet Daily. 12/11/17   Susan Borja MD   LORazepam (ATIVAN) 1 MG tablet     Susan Borja MD   meloxicam (MOBIC) 15 MG tablet Take 1 tablet by mouth Daily. 8/23/24      meloxicam (MOBIC) 15 MG tablet Take 1 tablet by mouth Daily. 12/12/24      montelukast (SINGULAIR) 10 MG tablet Take 1 tablet by mouth Daily. 11/13/23   Susan Borja MD   naloxone (NARCAN) 4 MG/0.1ML nasal spray Call 911. Don't prime. [place] in 1 nostril for overdose. Repeat in  2-3 minutes in other nostril if no or minimal breathing/responsiveness. 8/30/23   Eze Ratliff MD   ondansetron ODT (ZOFRAN-ODT) 4 MG disintegrating tablet Place 1 tablet on the tongue Every 8 (Eight) Hours As Needed for Nausea or Vomiting. 9/4/23   Kirt Gallo MD   oxyCODONE-acetaminophen (PERCOCET) 5-325 MG per tablet  5/21/24   ProviderSusan MD   Ozempic, 0.25 or 0.5 MG/DOSE, 2 MG/3ML solution pen-injector DIAL AND INJECT UNDER THE SKIN 0.5 MG WEEKLY 1/10/25   Gagan Watkins MD   promethazine (PHENERGAN) 25 MG tablet     ProviderSusan MD   rosuvastatin (Crestor) 10 MG tablet Take 1 tablet by mouth Every Night. 8/20/24 8/20/25  Gagan Watkins MD   tiZANidine (ZANAFLEX) 4 MG tablet Take 1 tablet every 8 hours by oral route as needed. 12/12/24              Objective   Physical Exam  Examination of left hand patient has less than 1 cm transverse wound proximal to MCP flexor crease of the ring and long finger.  She has intact flexion against resistance PIP/DIP joints.  She does not have good to point discrimination but is similar to all of her other fingers.  She does report sensation appears to be normal for her.  She has no bony tenderness  Procedures           ED Course                                                       Medical Decision Making  Risk  OTC drugs.    Patient's findings were discussed with her.  She reports her tetanus immunization is current.  Wound will not require repair.  Wound was cleansed with base trace and sterile dressing.  Patient's findings were discussed with her.  Advised to change dressing in 3 days.  Watch for infection.  May use antibiotic ointment to wound twice a day until healed.  Advised if she has feeling of numbness to the inside of ring or long finger she needs follow-up hand surgery evaluation.    Final diagnoses:   Laceration of left hand without foreign body, initial encounter       ED Disposition  ED Disposition       ED Disposition   Discharge     Condition   Stable    Comment   --               Nik Nunes MD  [address] [place] [address]  [phone]               Medication List      No changes were made to your prescriptions during this visit.            Serjio Luis MD  02/19/25 1047

## 2025-02-19 NOTE — DISCHARGE INSTRUCTIONS
Keep wound clean and dry.  Watch for infection.  May change dressing in 72 hours sooner if needed.  When changing dressing apply antibiotic ointment to wound.  May use Tylenol for pain.  If you have feeling of numbness to the inside of ring or long finger follow-up with Dr. Nunes for recheck

## 2025-02-20 ENCOUNTER — TELEPHONE (OUTPATIENT)
Dept: ENDOCRINOLOGY | Facility: CLINIC | Age: 55
End: 2025-02-20

## 2025-02-20 NOTE — TELEPHONE ENCOUNTER
Informed pt of response pt stated understanding pt wanted to know if she was going to be put on an alternative or to just dc ozempic for now.Please advise

## 2025-02-20 NOTE — TELEPHONE ENCOUNTER
Caller: Poly Jordan    Relationship: Self    Best call back number: [phone]    What is the best time to reach you: ANYTIME     Who are you requesting to speak with (clinical staff, provider,  specific staff member): CLINICAL    Do you know the name of the person who called: SELF    What was the call regarding: PATIENT WAS SEEN IN ER A COUPLE DAYS AGO, SHE HAS BLOOD CLOTS IN HER LEGS, NEEDING TO KNOW IF SHE SHOULD STOP TAKING THE OZEMPIC, SHE IS ALSO ON BLOOD THINNERS    Is it okay if the provider responds through MyChart:

## 2025-03-03 ENCOUNTER — LAB (OUTPATIENT)
Dept: ENDOCRINOLOGY | Facility: CLINIC | Age: 55
End: 2025-03-03
Payer: MEDICAID

## 2025-03-03 DIAGNOSIS — E11.65 TYPE 2 DIABETES MELLITUS WITH HYPERGLYCEMIA, WITHOUT LONG-TERM CURRENT USE OF INSULIN: ICD-10-CM

## 2025-03-04 LAB
ALBUMIN SERPL-MCNC: 4.7 G/DL (ref 3.8–4.9)
ALBUMIN/CREAT UR: 34 MG/G CREAT (ref 0–29)
ALP SERPL-CCNC: 101 IU/L (ref 44–121)
ALT SERPL-CCNC: 30 IU/L (ref 0–32)
AST SERPL-CCNC: 21 IU/L (ref 0–40)
BILIRUB SERPL-MCNC: 0.4 MG/DL (ref 0–1.2)
BUN SERPL-MCNC: 10 MG/DL (ref 6–24)
BUN/CREAT SERPL: 12 (ref 9–23)
CALCIUM SERPL-MCNC: 9.8 MG/DL (ref 8.7–10.2)
CHLORIDE SERPL-SCNC: 101 MMOL/L (ref 96–106)
CHOLEST SERPL-MCNC: 212 MG/DL (ref 100–199)
CO2 SERPL-SCNC: 26 MMOL/L (ref 20–29)
CREAT SERPL-MCNC: 0.85 MG/DL (ref 0.57–1)
CREAT UR-MCNC: 125.8 MG/DL
EGFRCR SERPLBLD CKD-EPI 2021: 82 ML/MIN/1.73
GLOBULIN SER CALC-MCNC: 2.9 G/DL (ref 1.5–4.5)
GLUCOSE SERPL-MCNC: 144 MG/DL (ref 70–99)
HBA1C MFR BLD: 7.3 % (ref 4.8–5.6)
HDLC SERPL-MCNC: 51 MG/DL
LDLC SERPL CALC-MCNC: 139 MG/DL (ref 0–99)
MICROALBUMIN UR-MCNC: 42.7 UG/ML
POTASSIUM SERPL-SCNC: 4 MMOL/L (ref 3.5–5.2)
PROT SERPL-MCNC: 7.6 G/DL (ref 6–8.5)
SODIUM SERPL-SCNC: 140 MMOL/L (ref 134–144)
TRIGL SERPL-MCNC: 124 MG/DL (ref 0–149)
VLDLC SERPL CALC-MCNC: 22 MG/DL (ref 5–40)

## 2025-03-07 RX ORDER — LISINOPRIL AND HYDROCHLOROTHIAZIDE 10; 12.5 MG/1; MG/1
1 TABLET ORAL DAILY
COMMUNITY
Start: 2025-02-26 | End: 2025-03-10

## 2025-03-10 ENCOUNTER — OFFICE VISIT (OUTPATIENT)
Dept: ENDOCRINOLOGY | Facility: CLINIC | Age: 55
End: 2025-03-10
Payer: MEDICAID

## 2025-03-10 VITALS
SYSTOLIC BLOOD PRESSURE: 145 MMHG | HEIGHT: 68 IN | BODY MASS INDEX: 37.44 KG/M2 | WEIGHT: 247 LBS | DIASTOLIC BLOOD PRESSURE: 90 MMHG | OXYGEN SATURATION: 99 % | HEART RATE: 91 BPM

## 2025-03-10 DIAGNOSIS — E66.01 CLASS 2 SEVERE OBESITY DUE TO EXCESS CALORIES WITH SERIOUS COMORBIDITY AND BODY MASS INDEX (BMI) OF 37.0 TO 37.9 IN ADULT: ICD-10-CM

## 2025-03-10 DIAGNOSIS — E11.65 TYPE 2 DIABETES MELLITUS WITH HYPERGLYCEMIA, WITHOUT LONG-TERM CURRENT USE OF INSULIN: Primary | ICD-10-CM

## 2025-03-10 DIAGNOSIS — E78.2 MIXED HYPERLIPIDEMIA: ICD-10-CM

## 2025-03-10 DIAGNOSIS — I10 HYPERTENSION, BENIGN: ICD-10-CM

## 2025-03-10 DIAGNOSIS — E66.812 CLASS 2 SEVERE OBESITY DUE TO EXCESS CALORIES WITH SERIOUS COMORBIDITY AND BODY MASS INDEX (BMI) OF 37.0 TO 37.9 IN ADULT: ICD-10-CM

## 2025-03-10 PROCEDURE — 1159F MED LIST DOCD IN RCRD: CPT | Performed by: INTERNAL MEDICINE

## 2025-03-10 PROCEDURE — 3080F DIAST BP >= 90 MM HG: CPT | Performed by: INTERNAL MEDICINE

## 2025-03-10 PROCEDURE — 99214 OFFICE O/P EST MOD 30 MIN: CPT | Performed by: INTERNAL MEDICINE

## 2025-03-10 PROCEDURE — 3051F HG A1C>EQUAL 7.0%<8.0%: CPT | Performed by: INTERNAL MEDICINE

## 2025-03-10 PROCEDURE — 3077F SYST BP >= 140 MM HG: CPT | Performed by: INTERNAL MEDICINE

## 2025-03-10 PROCEDURE — 1160F RVW MEDS BY RX/DR IN RCRD: CPT | Performed by: INTERNAL MEDICINE

## 2025-03-10 RX ORDER — ROSUVASTATIN CALCIUM 5 MG/1
5 TABLET, COATED ORAL NIGHTLY
Qty: 30 TABLET | Refills: 11 | Status: SHIPPED | OUTPATIENT
Start: 2025-03-10 | End: 2026-03-10

## 2025-03-10 RX ORDER — SEMAGLUTIDE 1.34 MG/ML
1 INJECTION, SOLUTION SUBCUTANEOUS WEEKLY
Qty: 3 ML | Refills: 6 | Status: SHIPPED | OUTPATIENT
Start: 2025-03-10

## 2025-03-10 NOTE — PATIENT INSTRUCTIONS
Please stop smoking  Increase Ozempic to 1 mg subcu weekly  Start Crestor at 5 mg p.o. daily  Check your blood pressure at home  Continue to work on your diet and activity  Always keep glucose source in case of low blood sugar  Labs before follow-up.

## 2025-03-10 NOTE — PROGRESS NOTES
Endocrine Progress Note Outpatient     Patient Care Team:  Abril Hopper APRN as PCP - Libby Friend (Nurse Practitioner)    Chief Complaint: Follow-up type 2 diabetes    HPI: 54-year-old female with history of type 2 diabetes, hypertension, hyperlipidemia, morbid obesity is here for follow-up.      For type 2 diabetes she is currently taking Ozempic 0.5 mg subcu weekly.  She is off insulin and Jardiance.  She tells me that most of the blood sugars are less than 140.  She is tolerating her medications well.  She has gained about 18 pounds of weight since last seen.    Hypertension: Well-controlled    Hyperlipidemia: She was on atorvastatin but has not been taking it.    Past Medical History:   Diagnosis Date    Arthritis     Back pain     Cervical disc disorder     Diabetes     DM (diabetes mellitus), type 2     History of MRSA infection     Hyperlipidemia     Hypertension     Hypertension     Kidney stone     Low back pain     Torn meniscus     left knee pain- torn meniscus (ABSTRACTED FROM [employer])       Social History     Socioeconomic History    Marital status:     Number of children: 3    Years of education: 12   Tobacco Use    Smoking status: Every Day     Current packs/day: 0.50     Average packs/day: 0.5 packs/day for 5.0 years (2.5 ttl pk-yrs)     Types: Cigarettes    Smokeless tobacco: Never   Vaping Use    Vaping status: Never Used   Substance and Sexual Activity    Alcohol use: Not Currently     Comment: Rarely    Drug use: No    Sexual activity: Not Currently     Partners: Male       Family History   Problem Relation Age of Onset    Diabetes Mother     No Known Problems Father     Diabetes Other        No Known Allergies    ROS:   Constitutional:  Denies fatigue, tiredness.    Eyes:  Denies change in visual acuity   HENT:  Denies nasal congestion or sore throat   Respiratory: denies cough, shortness of breath.   Cardiovascular:  denies chest pain, edema   GI:  Denies abdominal  pain, nausea, vomiting.   Musculoskeletal:  Denies back pain or joint pain   Integument:  Denies dry skin and rash   Neurologic:  Denies headache, focal weakness or sensory changes   Endocrine:  Denies polyuria or polydipsia   Psychiatric:  Denies depression or anxiety      Vitals:    03/10/25 1052   BP: 145/90   Pulse: 91   SpO2: 99%     Body mass index is 37.56 kg/m².     Physical Exam:  GEN: NAD, conversant, obese  EYES: EOMI,  NECK: no thyromegaly,   CV: RRR,   LUNG: CTA  PSYCH: AOX3, appropriate mood, affect normal      Results Review:     I reviewed the patient's new clinical results.      Lab Results   Component Value Date    GLUCOSE 144 (H) 03/03/2025    BUN 10 03/03/2025    CREATININE 0.85 03/03/2025    EGFRIFAFRI 110 08/12/2021    BCR 12 03/03/2025    K 4.0 03/03/2025    CO2 26 03/03/2025    CALCIUM 9.8 03/03/2025    ALBUMIN 4.7 03/03/2025    AST 21 03/03/2025    ALT 30 03/03/2025    CHOL 195 08/19/2024    TRIG 124 03/03/2025     (H) 03/03/2025    HDL 51 03/03/2025         Medication Review: Reviewed.       Current Outpatient Medications:     albuterol sulfate  (90 Base) MCG/ACT inhaler, Inhale 2 puffs Every 4 (Four) Hours As Needed for Wheezing., Disp: 1 g, Rfl: 0    cyclobenzaprine (FLEXERIL) 5 MG tablet, , Disp: , Rfl:     diclofenac (VOLTAREN) 75 MG EC tablet, Take 1 tablet by mouth 2 (Two) Times a Day As Needed (pain)., Disp: 20 tablet, Rfl: 0    dicyclomine (BENTYL) 10 MG capsule, , Disp: , Rfl:     ergocalciferol (ERGOCALCIFEROL) 1.25 MG (59782 UT) capsule, Take 1 capsule by mouth 1 (One) Time Per Week., Disp: , Rfl:     escitalopram (LEXAPRO) 20 MG tablet, TAKE ONE (1) TABLET BY MOUTH EVERY NIGHT AT BEDTIME, Disp: , Rfl:     fluconazole (DIFLUCAN) 200 MG tablet, , Disp: , Rfl:     gabapentin (NEURONTIN) 600 MG tablet, TAKE ONE (1) TABLET BY MOUTH UP TO THREE TIMES DAILY, Disp: , Rfl:     gabapentin (NEURONTIN) 600 MG tablet, Take 1 tablet 3 times a day by oral route., Disp: 90 tablet,  Rfl: 0    glucose blood (FREESTYLE LITE) test strip, FREESTYLE LITE TEST STRP, Disp: , Rfl:     HYDROcodone-acetaminophen (NORCO)  MG per tablet, Every 8 (Eight) Hours., Disp: , Rfl:     HYDROcodone-acetaminophen (NORCO)  MG per tablet, Take 1 tablet every 6 hours by oral route as needed for 30 days., Disp: 120 tablet, Rfl: 0    hydrOXYzine (ATARAX) 50 MG tablet, TAKE ONE (1) TABLET BY MOUTH ONE (1) hour prior TO procedure, MAY REPEAT one IN 45 MINUTES, Disp: , Rfl:     lidocaine (XYLOCAINE) 5 % ointment, , Disp: , Rfl:     lisinopril-hydrochlorothiazide (PRINZIDE,ZESTORETIC) 20-25 MG per tablet, Daily., Disp: , Rfl:     LORazepam (ATIVAN) 1 MG tablet, , Disp: , Rfl:     meloxicam (MOBIC) 15 MG tablet, Take 1 tablet by mouth Daily., Disp: 30 tablet, Rfl: 3    montelukast (SINGULAIR) 10 MG tablet, Take 1 tablet by mouth Daily., Disp: , Rfl:     naloxone (NARCAN) 4 MG/0.1ML nasal spray, Call 911. Don't prime. [place] in 1 nostril for overdose. Repeat in 2-3 minutes in other nostril if no or minimal breathing/responsiveness., Disp: 2 each, Rfl: 0    ondansetron ODT (ZOFRAN-ODT) 4 MG disintegrating tablet, Place 1 tablet on the tongue Every 8 (Eight) Hours As Needed for Nausea or Vomiting., Disp: 20 tablet, Rfl: 0    oxyCODONE-acetaminophen (PERCOCET) 5-325 MG per tablet, , Disp: , Rfl:     Ozempic, 0.25 or 0.5 MG/DOSE, 2 MG/3ML solution pen-injector, DIAL AND INJECT UNDER THE SKIN 0.5 MG WEEKLY, Disp: 3 mL, Rfl: 2    promethazine (PHENERGAN) 25 MG tablet, , Disp: , Rfl:     rivaroxaban (XARELTO) 15 MG tablet, Take 1 tablet by mouth., Disp: , Rfl:     rivaroxaban (XARELTO) 20 MG tablet, Take 1 tablet by mouth Daily., Disp: , Rfl:     rosuvastatin (Crestor) 10 MG tablet, Take 1 tablet by mouth Every Night., Disp: 30 tablet, Rfl: 11    tiZANidine (ZANAFLEX) 4 MG tablet, Take 1 tablet every 8 hours by oral route as needed., Disp: 90 tablet, Rfl: 0      Assessment & Plan   1.  Diabetes mellitus type 2 with  Hyperglycemia: Uncontrolled with A1c slightly worse at 7.3%.  At this time I will increase Ozempic to 1 mg subcu weekly and she will continue to work on diet and activity and will follow blood sugars and A1c.    2.  Hypertension: Blood pressure is uncontrolled but she was recently started on a blood pressure medication last week by her primary care, recommend to follow blood pressure at home.    3.  Hyperlipidemia: Uncontrolled with high LDL, will add Crestor at 5 mg p.o. daily and follow lipid panel.    4.  Class II obesity: BMI is 37.6, encouraged to continue to work on diet and activity.    Assessment and plan from August 16, 2024 reviewed and updated.                Gagan Watkins MD FACE.

## 2025-06-23 NOTE — CONSULTS
GI CONSULT  NOTE:    Referring Provider:  Dr Sandy    Chief complaint:    Recent diagnosis of diverticulitis.  New inflammatory changes on CT.    Subjective   Abdominal pain    History of present illness:    Patient is a 52-year-old female with a history of diverticulitis, diabetes, hypertension who was seen in the ER on 8/30/2023 and admitted for 24 hours for diverticulitis and UTI.  She was discharged home on Augmentin.  She returned on 9/2/2023 with continued abdominal and back pain.  She had taken some Dulcolax and MiraLAX at home with little resultant bowel movement.  Patient feels constipated.  Patient underwent repeat CT 9/3/2023 that showed acute colitis/diverticulitis of the sigmoid colon.  Area of involvement is slightly more distal than the previous inflammatory changes seen on CT from 8/30/2023.  Previously described inflammatory changes have resolved.  Given configuration underlying mass cannot be excluded.  Recommend colonoscopy.  No significant stool burden.  Patient states she took Dulcolax laxative x3 pills and MiraLAX on Tuesday, 8/29/2023 and had little relief.  She had no further bowel movements until Saturday, 9/2/2023 and states it was liquid brown without any stool.  Patient normally has 1 bowel movement every day.  States in December she had an episode of constipation was given sorbitol and flushed her bowels then went through colonoscopy and has not had any problems since.  States she did not have to be treated for antibiotics during that episode.  She denies any blood or black in her stool.  She has had mild nausea.  Occasional alcohol use and smokes less than 1 pack of cigarettes a day.    Labs: Sodium 138, potassium 3.9, creatinine 0.61.  LFTs show total bilirubin of 0.3, alk phos 139, AST 34, ALT 28.  Hemoglobin A1c 10.3%.  Lipase 36.  WBCs 9, hemoglobin 13.3, platelets 268.    Endo History:  12/6/2022 colonoscopy (Dr. Ren) moderate diverticulosis of the sigmoid colon.  Internal  Quality 226: Preventive Care And Screening: Tobacco Use: Screening And Cessation Intervention: Patient screened for tobacco use and is an ex/non-smoker Detail Level: Detailed hemorrhoids.  TA polyps.  Recall 2029.    Past Medical History:  Past Medical History:   Diagnosis Date    Arthritis     Back pain     Cervical disc disorder     Diabetes     DM (diabetes mellitus), type 2     History of MRSA infection     Hyperlipidemia     Hypertension     Hypertension     Kidney stone     Low back pain     Torn meniscus     left knee pain- torn meniscus ([facility]CTED FROM [facility])       Past Surgical History:  Past Surgical History:   Procedure Laterality Date    CYSTOSCOPY W/ LITHOLAPAXY / [facility]      FOOT SURGERY Right 2018    ORIF TIBIA/FIBULA FRACTURES Right     right tib/fib ORIF (ABSTRACTED FROM [employer])    OTHER SURGICAL HISTORY Right 01/12/2018    right fusion tibiotalocalcaneal (ABSTRACTED FROM [employer])    TUBAL ABDOMINAL LIGATION  2001       Social History:  Social History     Tobacco Use    Smoking status: Every Day     Packs/day: 0.50     Years: 5.00     Pack years: 2.50     Types: Cigarettes    Smokeless tobacco: Never   Vaping Use    Vaping Use: Never used   Substance Use Topics    Alcohol use: Not Currently     Comment: Rarely    Drug use: No       Family History:  Family History   Problem Relation Age of Onset    Diabetes Mother     No Known Problems Father     Diabetes Other        Medications:  Medications Prior to Admission   Medication Sig Dispense Refill Last Dose    albuterol sulfate  (90 Base) MCG/ACT inhaler Inhale 2 puffs Every 4 (Four) Hours As Needed for Wheezing. 1 g 0 Past Week    amoxicillin-clavulanate (AUGMENTIN) 875-125 MG per tablet Take 1 tablet by mouth Every 12 (Twelve) Hours for 11 doses. Indications: Infection Within the Abdomen 11 tablet 0 9/2/2023    atorvastatin (LIPITOR) 10 MG tablet TAKE ONE (1) TABLET BY MOUTH DAILY 30 tablet 1 9/2/2023    Diclofenac Sodium (VOLTAREN) 1 % gel gel APPLY TWO (2) grams TOPICALLY TO THE AFFECTED AREA FOUR TIMES DAILY   9/2/2023    ergocalciferol (ERGOCALCIFEROL) 1.25 MG (86417 UT) capsule Take 1 capsule by mouth  Daily.   8/28/2023    escitalopram (LEXAPRO) 20 MG tablet TAKE ONE (1) TABLET BY MOUTH EVERY NIGHT AT BEDTIME   9/2/2023    gabapentin (NEURONTIN) 800 MG tablet Take 1 tablet by mouth 3 (Three) Times a Day for 60 days. 90 tablet 1 9/2/2023    glucose blood (FREESTYLE LITE) test strip FREESTYLE LITE TEST STRP   9/2/2023    HYDROcodone-acetaminophen (NORCO)  MG per tablet Every 8 (Eight) Hours.   Past Week    Insulin Glargine (BASAGLAR KWIKPEN) 100 UNIT/ML injection pen Inject 55 Units under the skin into the appropriate area as directed Daily.   9/2/2023    Jardiance 10 MG tablet tablet TAKE ONE (1) TABLET BY MOUTH DAILY 30 tablet 1 9/2/2023    lisinopril-hydrochlorothiazide (PRINZIDE,ZESTORETIC) 20-25 MG per tablet Daily.   9/2/2023    meloxicam (MOBIC) 15 MG tablet TAKE ONE-HALF (1/2) TO ONE (1) TABLET BY MOUTH EVERY DAY   9/2/2023    oxyCODONE (ROXICODONE) 10 MG tablet Take 1 tablet by mouth Every 6 (Six) Hours As Needed for Moderate Pain for up to 4 days. 16 tablet 0 9/2/2023    sitaGLIPtin-metFORMIN (Janumet)  MG per tablet Take 1 tablet by mouth.   9/2/2023    tiZANidine (ZANAFLEX) 4 MG tablet    9/2/2023    brompheniramine-pseudoephedrine-DM 30-2-10 MG/5ML syrup Take 5 mL by mouth 4 (Four) Times a Day As Needed for Congestion or Cough. 210 mL 0 Unknown    naloxone (NARCAN) 4 MG/0.1ML nasal spray Call 911. Don't prime. [place] in 1 nostril for overdose. Repeat in 2-3 minutes in other nostril if no or minimal breathing/responsiveness. 2 each 0 Unknown       Scheduled Meds:atorvastatin, 10 mg, Oral, Nightly  insulin lispro, 2-9 Units, Subcutaneous, 4x Daily AC & at Bedtime  piperacillin-tazobactam, 3.375 g, Intravenous, Q8H  senna-docusate sodium, 2 tablet, Oral, BID      Continuous Infusions:   PRN Meds:.  acetaminophen **OR** acetaminophen **OR** acetaminophen    albuterol sulfate HFA    aluminum-magnesium hydroxide-simethicone    senna-docusate sodium **AND** polyethylene glycol **AND** bisacodyl  **AND** bisacodyl    Calcium Replacement - Follow Nurse / BPA Driven Protocol    dextrose    dextrose    glucagon (human recombinant)    HYDROmorphone    Magnesium Standard Dose Replacement - Follow Nurse / BPA Driven Protocol    melatonin    ondansetron **OR** ondansetron    Phosphorus Replacement - Follow Nurse / BPA Driven Protocol    Potassium Replacement - Follow Nurse / BPA Driven Protocol    [COMPLETED] Insert Peripheral IV **AND** sodium chloride    ALLERGIES:  Patient has no known allergies.    ROS:  Review of Systems   Gastrointestinal:  Positive for abdominal pain and nausea. Negative for anal bleeding, blood in stool and vomiting.     The following systems were reviewed and negative;   Constitution:  No fevers, chills, no unintentional weight loss  Skin: no rash, no jaundice  Eyes:  No blurry vision, no eye pain  HENT:  No change in hearing or smell  Resp:  No dyspnea or cough  CV:  No chest pain or palpitations  :  No dysuria, hematuria  Musculoskeletal:  No leg cramps or arthralgias  Neuro:  No tremor, no numbness  Psych:  No depression or confusion    Objective resting in hospital bed.  NAD.  No family present.  Room 210.    Vital Signs:   Vitals:    09/03/23 0216 09/03/23 0230 09/03/23 0300 09/03/23 0708   BP: 117/78  127/81 115/77   Pulse: 80 77 79 70   Resp:   16 19   Temp:   98.5 °F (36.9 °C) 98.1 °F (36.7 °C)   TempSrc:   Oral Oral   SpO2: 93% 95% 93% 95%   Weight:       Height:           Physical Exam:   General Appearance:    Awake and alert, in no acute distress   Head:    Normocephalic, without obvious abnormality, atraumatic   Eyes:            Conjunctivae normal, anicteric sclerae, pupils equal   Ears:    Ears appear intact with no abnormalities noted   Throat:   No oral lesions, no thrush, oral mucosa moist   Neck:   Supple, no JVD   Lungs:     respirations regular, even and unlabored        Chest Wall:    No abnormalities observed   Abdomen:     soft, nontender, no rebound or guarding,  nondistended, no hepatosplenomegaly   Rectal:     Deferred   Extremities:   Moves all extremities, no edema, no cyanosis   Pulses:   Pulses palpable and equal bilaterally   Skin:   No rash, no jaundice, normal palpation        Neurologic:   Cranial nerves 2 - 12 grossly intact, no asterixis       Results Review:   I reviewed the patient's labs and imaging.  CBC    Results from last 7 days   Lab Units 09/02/23 2206 08/29/23  2148   WBC 10*3/mm3 9.00 11.20*   HEMOGLOBIN g/dL 13.3 13.9   PLATELETS 10*3/mm3 268 236     CMP   Results from last 7 days   Lab Units 09/02/23 2206 08/29/23  2148   SODIUM mmol/L 138 139   POTASSIUM mmol/L 3.9 3.5   CHLORIDE mmol/L 101 103   CO2 mmol/L 27.0 26.0   BUN mg/dL 8 8   CREATININE mg/dL 0.61 0.61   GLUCOSE mg/dL 230* 161*   ALBUMIN g/dL 3.9 4.1   BILIRUBIN mg/dL 0.3 0.3   ALK PHOS U/L 139* 107   AST (SGOT) U/L 34* 15   ALT (SGPT) U/L 28 17   MAGNESIUM mg/dL 1.8  --    PHOSPHORUS mg/dL 3.0  --    LIPASE U/L 36 34     Cr Clearance Estimated Creatinine Clearance: 146.8 mL/min (by C-G formula based on SCr of 0.61 mg/dL).  Coag     HbA1C   Lab Results   Component Value Date    HGBA1C 10.30 (H) 09/02/2023    HGBA1C 9.7 (H) 08/12/2021     Blood Glucose   Glucose   Date/Time Value Ref Range Status   09/03/2023 1152 152 (H) 70 - 105 mg/dL Final     Comment:     Serial Number: [id number]:  [id number]   09/03/2023 0710 213 (H) 70 - 105 mg/dL Final     Comment:     Serial Number: [id number]:  [id number]     Infection     UA    Results from last 7 days   Lab Units 09/02/23  2342   NITRITE UA  Negative   WBC UA /HPF 3-5*   BACTERIA UA /HPF Trace*   SQUAM EPITHEL UA /HPF 7-12*     Radiology(recent) CT Abdomen Pelvis With Contrast    Result Date: 9/3/2023  Impression: 1. Acute colitis/diverticulitis of the sigmoid colon. Area of involvement is slightly more distal than the previous inflammatory changes seen on CT from 8/30/2023. Previously described inflammatory changes have resolved.  Given the configuration, underlying mass cannot be excluded. Colonoscopic correlation recommended. No additional inflammatory changes. No evidence of perforation or focal collection. 2. Ancillary findings as described above. Electronically Signed: Ai Stein MD  9/3/2023 12:36 AM EDT  Workstation ID: [id number]       ASSESSMENT:  Sigmoid diverticulitis failed outpatient treatment  Constipation  Elevated LFTs  Diabetes with a hemoglobin A1c of 10.3%  Hypertension  History of MRSA  Recent urinary tract infection  Personal history of colon polyps    PLAN:  Agree with Zosyn as she has failed Augmentin as an outpatient.  Recommend bowel purge.  We will give a dose of sorbitol and also Dulcolax suppository.  We will start MiraLAX twice a day also.  Discussed taking MiraLAX on a daily basis at home to prevent constipation.  Dicyclomine or Bentyl for abdominal pain/cramping with purge  No need for repeat colonoscopy as she has just had one 9 months ago.  It is only recommended if she has not had 1 in the previous 1 year.  Low residue diet     I discussed the patient's findings and my recommendations with the patient.  LIZZETTE Ovalle  09/03/23  12:56 EDT     Time:

## 2025-07-15 ENCOUNTER — LAB (OUTPATIENT)
Dept: ENDOCRINOLOGY | Facility: CLINIC | Age: 55
End: 2025-07-15
Payer: MEDICAID

## 2025-07-15 DIAGNOSIS — E11.65 TYPE 2 DIABETES MELLITUS WITH HYPERGLYCEMIA, WITHOUT LONG-TERM CURRENT USE OF INSULIN: ICD-10-CM

## 2025-07-22 ENCOUNTER — TELEPHONE (OUTPATIENT)
Dept: ENDOCRINOLOGY | Facility: CLINIC | Age: 55
End: 2025-07-22

## 2025-08-07 ENCOUNTER — OFFICE VISIT (OUTPATIENT)
Dept: ENDOCRINOLOGY | Facility: CLINIC | Age: 55
End: 2025-08-07
Payer: MEDICAID

## 2025-08-07 VITALS
HEART RATE: 89 BPM | HEIGHT: 68 IN | OXYGEN SATURATION: 98 % | BODY MASS INDEX: 35.61 KG/M2 | DIASTOLIC BLOOD PRESSURE: 85 MMHG | SYSTOLIC BLOOD PRESSURE: 132 MMHG | WEIGHT: 235 LBS

## 2025-08-07 DIAGNOSIS — I10 HYPERTENSION, BENIGN: ICD-10-CM

## 2025-08-07 DIAGNOSIS — E78.2 MIXED HYPERLIPIDEMIA: ICD-10-CM

## 2025-08-07 DIAGNOSIS — E11.65 TYPE 2 DIABETES MELLITUS WITH HYPERGLYCEMIA, WITHOUT LONG-TERM CURRENT USE OF INSULIN: Primary | ICD-10-CM

## 2025-08-07 DIAGNOSIS — E66.01 CLASS 2 SEVERE OBESITY DUE TO EXCESS CALORIES WITH SERIOUS COMORBIDITY AND BODY MASS INDEX (BMI) OF 35.0 TO 35.9 IN ADULT: ICD-10-CM

## 2025-08-07 DIAGNOSIS — E66.812 CLASS 2 SEVERE OBESITY DUE TO EXCESS CALORIES WITH SERIOUS COMORBIDITY AND BODY MASS INDEX (BMI) OF 35.0 TO 35.9 IN ADULT: ICD-10-CM

## 2025-08-07 RX ORDER — METHOCARBAMOL 750 MG/1
750 TABLET, FILM COATED ORAL 3 TIMES DAILY
COMMUNITY
Start: 2025-03-24

## 2025-08-07 RX ORDER — ROSUVASTATIN CALCIUM 10 MG/1
10 TABLET, COATED ORAL NIGHTLY
Qty: 90 TABLET | Refills: 2 | Status: SHIPPED | OUTPATIENT
Start: 2025-08-07

## 2025-08-07 RX ORDER — PAROXETINE 10 MG/1
10 TABLET, FILM COATED ORAL DAILY
COMMUNITY
Start: 2025-06-05

## 2025-08-07 RX ORDER — METRONIDAZOLE 500 MG/1
TABLET ORAL
COMMUNITY
Start: 2025-06-06

## 2025-08-07 RX ORDER — AMANTADINE HYDROCHLORIDE 100 MG/1
TABLET ORAL
COMMUNITY
Start: 2025-06-13

## 2025-08-07 RX ORDER — SEMAGLUTIDE 2.68 MG/ML
2 INJECTION, SOLUTION SUBCUTANEOUS WEEKLY
Qty: 9 ML | Refills: 2 | Status: SHIPPED | OUTPATIENT
Start: 2025-08-07